# Patient Record
Sex: FEMALE | Race: WHITE | HISPANIC OR LATINO | Employment: OTHER | URBAN - METROPOLITAN AREA
[De-identification: names, ages, dates, MRNs, and addresses within clinical notes are randomized per-mention and may not be internally consistent; named-entity substitution may affect disease eponyms.]

---

## 2017-01-03 RX ORDER — CYCLOBENZAPRINE HCL 10 MG
TABLET ORAL
Qty: 60 TAB | Refills: 0 | Status: SHIPPED | OUTPATIENT
Start: 2017-01-03 | End: 2017-02-17 | Stop reason: SDUPTHER

## 2017-01-11 DIAGNOSIS — I10 ESSENTIAL HYPERTENSION: ICD-10-CM

## 2017-01-11 NOTE — TELEPHONE ENCOUNTER
Was the patient seen in the last year in this department? Yes 10/13/2016    Does patient have an active prescription for medications requested? No     Received Request Via: Pharmacy

## 2017-01-12 RX ORDER — ATENOLOL 50 MG/1
50 TABLET ORAL
Qty: 90 TAB | Refills: 0 | Status: SHIPPED | OUTPATIENT
Start: 2017-01-12 | End: 2017-04-06 | Stop reason: SDUPTHER

## 2017-01-12 NOTE — TELEPHONE ENCOUNTER
Was the patient seen in the last year in this department? Yes     Does patient have an active prescription for medications requested? No     Received Request Via: Pharmacy      Pt met protocol?: Yes  OV 10/13/16  BP Readings from Last 1 Encounters:   10/13/16 140/92

## 2017-01-16 ENCOUNTER — HOSPITAL ENCOUNTER (OUTPATIENT)
Facility: MEDICAL CENTER | Age: 67
End: 2017-01-16
Attending: NURSE PRACTITIONER
Payer: COMMERCIAL

## 2017-01-16 ENCOUNTER — HOSPITAL ENCOUNTER (OUTPATIENT)
Dept: LAB | Facility: MEDICAL CENTER | Age: 67
End: 2017-01-16
Attending: NURSE PRACTITIONER
Payer: COMMERCIAL

## 2017-01-16 ENCOUNTER — OFFICE VISIT (OUTPATIENT)
Dept: MEDICAL GROUP | Facility: PHYSICIAN GROUP | Age: 67
End: 2017-01-16
Payer: COMMERCIAL

## 2017-01-16 VITALS
HEIGHT: 67 IN | BODY MASS INDEX: 24.64 KG/M2 | TEMPERATURE: 98.2 F | HEART RATE: 68 BPM | SYSTOLIC BLOOD PRESSURE: 128 MMHG | DIASTOLIC BLOOD PRESSURE: 82 MMHG | OXYGEN SATURATION: 94 % | WEIGHT: 157 LBS | RESPIRATION RATE: 12 BRPM

## 2017-01-16 DIAGNOSIS — M25.512 CHRONIC LEFT SHOULDER PAIN: ICD-10-CM

## 2017-01-16 DIAGNOSIS — M25.562 BILATERAL CHRONIC KNEE PAIN: ICD-10-CM

## 2017-01-16 DIAGNOSIS — G89.29 CHRONIC LEFT SHOULDER PAIN: ICD-10-CM

## 2017-01-16 DIAGNOSIS — Z79.899 CONTROLLED SUBSTANCE AGREEMENT SIGNED: ICD-10-CM

## 2017-01-16 DIAGNOSIS — M54.50 CHRONIC BILATERAL LOW BACK PAIN WITHOUT SCIATICA: ICD-10-CM

## 2017-01-16 DIAGNOSIS — E03.8 OTHER SPECIFIED HYPOTHYROIDISM: ICD-10-CM

## 2017-01-16 DIAGNOSIS — G89.29 CHRONIC BILATERAL LOW BACK PAIN WITHOUT SCIATICA: ICD-10-CM

## 2017-01-16 DIAGNOSIS — F11.20 OPIOID DEPENDENCE, CONTINUOUS (HCC): ICD-10-CM

## 2017-01-16 DIAGNOSIS — M25.561 BILATERAL CHRONIC KNEE PAIN: ICD-10-CM

## 2017-01-16 DIAGNOSIS — G89.29 BILATERAL CHRONIC KNEE PAIN: ICD-10-CM

## 2017-01-16 DIAGNOSIS — E05.00 GRAVES' DISEASE: ICD-10-CM

## 2017-01-16 DIAGNOSIS — M25.512 CHRONIC LEFT SHOULDER PAIN: Primary | ICD-10-CM

## 2017-01-16 DIAGNOSIS — G89.29 CHRONIC LEFT SHOULDER PAIN: Primary | ICD-10-CM

## 2017-01-16 PROCEDURE — 80307 DRUG TEST PRSMV CHEM ANLYZR: CPT

## 2017-01-16 PROCEDURE — 36415 COLL VENOUS BLD VENIPUNCTURE: CPT

## 2017-01-16 PROCEDURE — 84439 ASSAY OF FREE THYROXINE: CPT

## 2017-01-16 PROCEDURE — G0480 DRUG TEST DEF 1-7 CLASSES: HCPCS | Mod: 91

## 2017-01-16 PROCEDURE — 99214 OFFICE O/P EST MOD 30 MIN: CPT | Performed by: NURSE PRACTITIONER

## 2017-01-16 PROCEDURE — G0480 DRUG TEST DEF 1-7 CLASSES: HCPCS

## 2017-01-16 PROCEDURE — 84443 ASSAY THYROID STIM HORMONE: CPT

## 2017-01-16 RX ORDER — HYDROCODONE BITARTRATE AND ACETAMINOPHEN 5; 325 MG/1; MG/1
1 TABLET ORAL EVERY 8 HOURS PRN
Qty: 90 TAB | Refills: 0 | Status: SHIPPED | OUTPATIENT
Start: 2017-01-16 | End: 2017-05-24 | Stop reason: SDUPTHER

## 2017-01-16 RX ORDER — HYDROCODONE BITARTRATE AND ACETAMINOPHEN 5; 325 MG/1; MG/1
1-2 TABLET ORAL EVERY 8 HOURS PRN
Qty: 90 TAB | Refills: 0 | Status: SHIPPED | OUTPATIENT
Start: 2017-01-16 | End: 2017-05-24 | Stop reason: SDUPTHER

## 2017-01-16 NOTE — MR AVS SNAPSHOT
"        Kelli Monserrat Vinsonles   2017 1:20 PM   Office Visit   MRN: [id number]    Department:  [facility]   Dept Phone:  [phone]    Description:  Female : 1950   Provider:  TOMA Landaverde           Reason for Visit     Medication Refill           Allergies as of 2017     No Known Allergies      You were diagnosed with     Chronic left shoulder pain   [187968]  -  Primary     Bilateral chronic knee pain   [839114]       Chronic bilateral low back pain without sciatica   [2036029]       Opioid dependence, continuous (CMS-HCC)   [205203]       Controlled substance agreement signed   [172244]         Vital Signs     Blood Pressure Pulse Temperature Respirations Height Weight    128/82 mmHg 68 36.8 °C (98.2 °F) 12 1.702 m (5' 7\") 71.215 kg (157 lb)    Body Mass Index Oxygen Saturation Smoking Status             24.58 kg/m2 94% Current Every Day Smoker         Basic Information     Date Of Birth Sex Race Ethnicity Preferred Language    1950 Female  or   Origin (Mauritian,Senegalese,Canadian,Canadian, etc) English      Problem List              ICD-10-CM Priority Class Noted - Resolved    Graves' disease E05.00   2011 - Present    Hypertension I10   2011 - Present    Anxiety F41.9   2011 - Present    Chronic back pain M54.9, G89.29   2011 - Present    Hyperlipidemia E78.5   2011 - Present    Routine health maintenance Z00.00   10/18/2011 - Present    Prediabetes R73.03   2011 - Present    Hypothyroid E03.9   2011 - Present    Current smoker F17.200   2013 - Present    Chronic left shoulder pain M25.512, G89.29   2015 - Present    DDD (degenerative disc disease), lumbar M51.36   2015 - Present      Health Maintenance        Date Due Completion Dates    DIABETES MONOFILAMENT / LE EXAM 6/10/1952 ---    RETINAL SCREENING 12/10/1969 ---    URINE ACR / MICROALBUMIN 12/10/1969 ---    IMM DTaP/Tdap/Td Vaccine (1 - Tdap) " 4/5/2011 4/4/2011    IMM ZOSTER VACCINE 12/10/2011 ---    BONE DENSITY 12/10/2016 9/9/2009    A1C SCREENING 2/28/2017 8/30/2016, 5/20/2016, 11/18/2015, 10/15/2014, 1/14/2014, 9/13/2012    MAMMOGRAM 5/23/2017 5/23/2016 (Declined), 8/26/2015 (Declined)    Override on 5/23/2016: Patient Declined    Override on 8/26/2015: Patient Declined    FASTING LIPID PROFILE 8/30/2017 8/30/2016, 5/20/2016, 11/18/2015, 3/12/2015, 10/15/2014, 1/14/2014, 4/18/2013, 9/13/2012, 10/18/2011, 4/28/2011    SERUM CREATININE 8/30/2017 8/30/2016, 5/20/2016, 11/18/2015, 10/15/2014, 1/14/2014, 4/18/2013, 9/13/2012, 10/18/2011, 4/28/2011    IMM PNEUMOCOCCAL 65+ (ADULT) LOW/MEDIUM RISK SERIES (2 of 2 - PPSV23) 1/14/2019 2/8/2016, 1/14/2014    PAP SMEAR 5/23/2019 5/23/2016 (Declined), 8/26/2015 (Declined)    Override on 5/23/2016: Patient Declined    Override on 8/26/2015: Patient Declined    COLONOSCOPY 1/14/2022 1/14/2012 (Prv Comp)    Override on 1/14/2012: Previously completed (DHA)            Current Immunizations     13-VALENT PCV PREVNAR 2/8/2016    Influenza TIV (IM) 1/14/2014    Influenza Vaccine Pediatric 10/18/2011    Influenza Vaccine Quad Inj (Pf) 10/13/2014    Influenza Vaccine Quad Inj (Preserved) 10/13/2016, 11/19/2015    Pneumococcal polysaccharide vaccine (PPSV-23) 1/14/2014    TD Vaccine 4/4/2011  6:19 PM      Below and/or attached are the medications your provider expects you to take. Review all of your home medications and newly ordered medications with your provider and/or pharmacist. Follow medication instructions as directed by your provider and/or pharmacist. Please keep your medication list with you and share with your provider. Update the information when medications are discontinued, doses are changed, or new medications (including over-the-counter products) are added; and carry medication information at all times in the event of emergency situations     Allergies:  No Known Allergies          Medications  Valid as of:  January 16, 2017 -  1:50 PM    Generic Name Brand Name Tablet Size Instructions for use    Atenolol (Tab) TENORMIN 50 MG Take 1 Tab by mouth every day.        Atorvastatin Calcium (Tab) LIPITOR 40 MG Take 1 Tab by mouth every day.        Cyclobenzaprine HCl (Tab) FLEXERIL 10 MG TAKE 1 TABLET BY MOUTH TWICE DAILY AS NEEDED FOR MUSCLE SPAMS        Fluticasone Propionate (Suspension) FLONASE 50 MCG/ACT Spray 1 Spray in nose every day. EACH NOSTRIL        Hydrocodone-Acetaminophen (Tab) NORCO 5-325 MG Take 1-2 Tabs by mouth every 8 hours as needed.        Hydrocodone-Acetaminophen (Tab) NORCO 5-325 MG Take 1-2 Tabs by mouth every 8 hours as needed.        Hydrocodone-Acetaminophen (Tab) NORCO 5-325 MG Take 1 Tab by mouth every 8 hours as needed.        Levothyroxine Sodium (Tab) SYNTHROID 137 MCG Take 1 Tab by mouth Every morning on an empty stomach.        Lisinopril-Hydrochlorothiazide (Tab) PRINZIDE, ZESTORETIC 20-25 MG Take 1 Tab by mouth every day.        .                 Medicines prescribed today were sent to:     [facility] [id number] [place] [address] AT [address] [address]    Phone: [phone] Fax: [phone]    Open 24 Hours?: No      Medication refill instructions:       If your prescription bottle indicates you have medication refills left, it is not necessary to call your provider’s office. Please contact your pharmacy and they will refill your medication.    If your prescription bottle indicates you do not have any refills left, you may request refills at any time through one of the following ways: The online Oris4 system (except Urgent Care), by calling your provider’s office, or by asking your pharmacy to contact your provider’s office with a refill request. Medication refills are processed only during regular business hours and may not be available until the next business day. Your provider may request additional information  or to have a follow-up visit with you prior to refilling your medication.   *Please Note: Medication refills are assigned a new Rx number when refilled electronically. Your pharmacy may indicate that no refills were authorized even though a new prescription for the same medication is available at the pharmacy. Please request the medicine by name with the pharmacy before contacting your provider for a refill.        Your To Do List     Future Labs/Procedures Complete By Expires    PAIN MANAGEMENT SCRN, W/ RFLX TO QNT  As directed 1/16/2018    Comments:    Current Meds (name, sig, last dose):   Current outpatient prescriptions:   •  hydrocodone-acetaminophen, 1-2 Tab, Oral, Q8HRS PRN  •  hydrocodone-acetaminophen, 1-2 Tab, Oral, Q8HRS PRN  •  hydrocodone-acetaminophen, 1 Tab, Oral, Q8HRS PRN  •  atenolol, 50 mg, Oral, QDAY, 1/16/2017  •  cyclobenzaprine, TAKE 1 TABLET BY MOUTH TWICE DAILY AS NEEDED FOR MUSCLE SPAMS, 1/16/2017  •  levothyroxine, 137 mcg, Oral, AM ES, 1/16/2017  •  lisinopril-hydrochlorothiazide, 1 Tab, Oral, QDAY, 1/16/2017  •  fluticasone, 1 Spray, Nasal, QDAY, 1/16/2017  •  atorvastatin, 40 mg, Oral, QDAY, 1/16/2017            Referral     A referral request has been sent to our patient care coordination department. Please allow 3-5 business days for us to process this request and contact you either by phone or mail. If you do not hear from us by the 5th business day, please call us at [phone].           iContainers Access Code: [email or web address]  Expires: 2/11/2017 11:49 AM    iContainers  A secure, online tool to manage your health information     [employer]’s iContainers® is a secure, online tool that connects you to your personalized health information from the privacy of your home -- day or night - making it very easy for you to manage your healthcare. Once the activation process is completed, you can even access your medical information using the iContainers lenard, which is available for free in the  Apple Caleb store or Google Play store.     [employer] provides the following levels of access (as shown below):   My Chart Features   [facility] Primary [facility] Doctor [facility]  Specialists [facility]  Urgent  Care Non-[facility]  Primary Care  Doctor   Email your healthcare team securely and privately 24/7 X X X    Manage appointments: schedule your next appointment; view details of past/upcoming appointments X      Request prescription refills. X      View recent personal medical records, including lab and immunizations X X X X   View health record, including health history, allergies, medications X X X X   Read reports about your outpatient visits, procedures, consult and ER notes X X X X   See your discharge summary, which is a recap of your hospital and/or ER visit that includes your diagnosis, lab results, and care plan. X X       How to register for [employer] Nation:  1. Go to  [email or web address].  2. Click on the Sign Up Now box, which takes you to the New Member Sign Up page. You will need to provide the following information:  a. Enter your B[employer] Nation Access Code exactly as it appears at the top of this page. (You will not need to use this code after you’ve completed the sign-up process. If you do not sign up before the expiration date, you must request a new code.)   b. Enter your date of birth.   c. Enter your home email address.   d. Click Submit, and follow the next screen’s instructions.  3. Create a B[employer] Nation ID. This will be your B[employer] Nation login ID and cannot be changed, so think of one that is secure and easy to remember.  4. Create a [employer] password. You can change your password at any time.  5. Enter your Password Reset Question and Answer. This can be used at a later time if you forget your password.   6. Enter your e-mail address. This allows you to receive e-mail notifications when new information is available in [employer].  7. Click Sign Up. You can now view your health information.    For assistance activating your  MyChart account, call [phone]

## 2017-01-16 NOTE — PROGRESS NOTES
Chief Complaint   Patient presents with   • Medication Refill       HISTORY OF PRESENT ILLNESS: Patient is a 66 y.o. female established patient who presents today to discuss medication refills.    Chronic pain recheck:   Last dose of controlled substance: 1/15/17  Chronic pain treated with Norco 5/325 taken 2-3 times a day  Current alcohol or substance use: no    Consequences of Chronic Opiate therapy:  (5 A's)  Analgesia:  significantly improved  Activity:  significantly improved, works full-time at [employer] as   Adverse Events:  none  Aberrant Behaviors: no inappropriate refills requested, lost or stolen meds reported.   Affect/Mood: good grooming, full facial expressions, normal speech pattern and content, normal thought patterns, normal perception, good insight, normal reasoning    Nonnarcotic treatments that are being used: Heat, ice, OTC pain patches    Pain management agreement initiated/updated and signed on: updated today  Urine drug screen done: today, which is pending.    I have reviewed the medical records, the Prescription Monitoring Program and I have determined that controlled substance treatment is medically indicated.    Allergies:Review of patient's allergies indicates no known allergies.    [facility]   Medication Sig Dispense Refill   • hydrocodone-acetaminophen (NORCO) 5-325 MG Tab per tablet Take 1-2 Tabs by mouth every 8 hours as needed. 90 Tab 0   • hydrocodone-acetaminophen (NORCO) 5-325 MG Tab per tablet Take 1-2 Tabs by mouth every 8 hours as needed. 90 Tab 0   • hydrocodone-acetaminophen (NORCO) 5-325 MG Tab per tablet Take 1 Tab by mouth every 8 hours as needed. 90 Tab 0   • atenolol (TENORMIN) 50 MG Tab Take 1 Tab by mouth every day. 90 Tab 0   • cyclobenzaprine (FLEXERIL) 10 MG Tab TAKE 1 TABLET BY MOUTH TWICE DAILY AS NEEDED FOR MUSCLE SPAMS 60 Tab 0   • levothyroxine (SYNTHROID) 137 MCG Tab Take 1 Tab by mouth Every morning on an empty  "stomach. 30 Tab 3   • lisinopril-hydrochlorothiazide (PRINZIDE, ZESTORETIC) 20-25 MG per tablet Take 1 Tab by mouth every day. 90 Tab 1   • fluticasone (FLONASE) 50 MCG/ACT nasal spray Spray 1 Spray in nose every day. EACH NOSTRIL 1 Bottle 3   • atorvastatin (LIPITOR) 40 MG Tab Take 1 Tab by mouth every day. 90 Tab 3     No current Epic-ordered facility-administered medications on file.       Past Medical History   Diagnosis Date   • Grave's disease    • Hypertension    • Hyperlipidemia    • Graves' disease 4/7/2011   • Chronic pain 4/7/2011   • Anxiety 4/7/2011   • Chronic back pain 4/7/2011   • Active smoker 4/18/2013       Social History   Substance Use Topics   • Smoking status: Current Every Day Smoker   • Smokeless tobacco: Never Used      Comment: 6-7 cigarettes/day   • Alcohol Use: 0.0 oz/week     0 Standard drinks or equivalent per week      Comment: 2-3 occasionally \"when gambling\"       No family status information on file.     Family History   Problem Relation Age of Onset   • Diabetes Mother    • Hypertension Mother    • Diabetes Sister    • Diabetes Brother    • Cancer Neg Hx    • Heart Attack Neg Hx    • Heart Disease Neg Hx        ROS: see above    Review of Systems   Constitutional: Negative for fever, chills, weight loss and malaise/fatigue.   HENT: Negative for ear pain, nosebleeds, congestion, sore throat and neck pain.    Eyes: Negative for blurred vision.   Respiratory: Negative for cough, sputum production, shortness of breath and wheezing.    Cardiovascular: Negative for chest pain, palpitations, orthopnea and leg swelling.   Gastrointestinal: Negative for heartburn, nausea, vomiting and abdominal pain.   Genitourinary: Negative for dysuria, urgency and frequency.   Musculoskeletal: Negative for myalgias, back pain and joint pain.   Skin: Negative for rash and itching.   Neurological: Negative for dizziness, tingling, tremors, sensory change, focal weakness and headaches. " "  Endo/Heme/Allergies: Does not bruise/bleed easily.   Psychiatric/Behavioral: Negative for depression, suicidal ideas and memory loss.  The patient is not nervous/anxious and does not have insomnia.        Exam:  Blood pressure 128/82, pulse 68, temperature 36.8 °C (98.2 °F), resp. rate 12, height 1.702 m (5' 7\"), weight 71.215 kg (157 lb), SpO2 94 %.  General:  Well nourished, well developed female in NAD  Head is grossly normal.  Neck: Thyroid is not enlarged.  Pulmonary: Normal effort.   Cardiovascular: Regular rate.   Extremities: no clubbing, cyanosis, or edema.  Psych:  Mood and affect are normal.  Answering questions appropriately with good eye contact.      Please note that this dictation was created using voice recognition software. I have made every reasonable attempt to correct obvious errors, but I expect that there are errors of grammar and possibly content that I did not discover before finalizing the note.    Assessment/Plan:    1. Chronic left shoulder pain  hydrocodone-acetaminophen (NORCO) 5-325 MG Tab per tablet    hydrocodone-acetaminophen (NORCO) 5-325 MG Tab per tablet    hydrocodone-acetaminophen (NORCO) 5-325 MG Tab per tablet    REFERRAL TO PHYSICAL THERAPY Reason for Therapy: Eval/Treat/Report    Controlled Substance Treatment Agreement    PAIN MANAGEMENT SCRN, W/ RFLX TO QNT   2. Bilateral chronic knee pain  hydrocodone-acetaminophen (NORCO) 5-325 MG Tab per tablet    hydrocodone-acetaminophen (NORCO) 5-325 MG Tab per tablet    hydrocodone-acetaminophen (NORCO) 5-325 MG Tab per tablet    REFERRAL TO PHYSICAL THERAPY Reason for Therapy: Eval/Treat/Report    Controlled Substance Treatment Agreement    PAIN MANAGEMENT SCRN, W/ RFLX TO QNT   3. Chronic bilateral low back pain without sciatica  hydrocodone-acetaminophen (NORCO) 5-325 MG Tab per tablet    hydrocodone-acetaminophen (NORCO) 5-325 MG Tab per tablet    hydrocodone-acetaminophen (NORCO) 5-325 MG Tab per tablet    REFERRAL TO " PHYSICAL THERAPY Reason for Therapy: Eval/Treat/Report    Controlled Substance Treatment Agreement    PAIN MANAGEMENT SCRN, W/ RFLX TO QNT   4. Opioid dependence, continuous (CMS-HCC)  hydrocodone-acetaminophen (NORCO) 5-325 MG Tab per tablet    hydrocodone-acetaminophen (NORCO) 5-325 MG Tab per tablet    hydrocodone-acetaminophen (NORCO) 5-325 MG Tab per tablet    REFERRAL TO PHYSICAL THERAPY Reason for Therapy: Eval/Treat/Report    Controlled Substance Treatment Agreement    PAIN MANAGEMENT SCRN, W/ RFLX TO QNT   5. Controlled substance agreement signed  hydrocodone-acetaminophen (NORCO) 5-325 MG Tab per tablet    hydrocodone-acetaminophen (NORCO) 5-325 MG Tab per tablet    hydrocodone-acetaminophen (NORCO) 5-325 MG Tab per tablet    REFERRAL TO PHYSICAL THERAPY Reason for Therapy: Eval/Treat/Report    Controlled Substance Treatment Agreement    PAIN MANAGEMENT SCRN, W/ RFLX TO QNT        1.  Updated UDS and pain contract today  2.  Refill medications as previously prescribed.  3.  Referral to PT to address persistent knee pain  4.  RTC in 3 months, sooner prn.

## 2017-01-17 ENCOUNTER — TELEPHONE (OUTPATIENT)
Dept: MEDICAL GROUP | Facility: PHYSICIAN GROUP | Age: 67
End: 2017-01-17

## 2017-01-17 DIAGNOSIS — E05.00 GRAVES' DISEASE: ICD-10-CM

## 2017-01-17 LAB
T4 FREE SERPL-MCNC: 1.5 NG/DL (ref 0.53–1.43)
TSH SERPL DL<=0.005 MIU/L-ACNC: 0.18 UIU/ML (ref 0.3–3.7)

## 2017-01-17 NOTE — TELEPHONE ENCOUNTER
----- Message from TOMA Landaverde sent at 1/17/2017  6:40 AM PST -----  Thyroid levels are now a little too medicated.  I would like her to alternate the 125 and 137mcg, every other day.  Plan on repeating thyroid labs in 6-8 weeks.

## 2017-01-18 LAB
AMPHET CTO UR CFM-MCNC: NEGATIVE NG/ML
BARBITURATES CTO UR CFM-MCNC: NEGATIVE NG/ML
BENZODIAZ CTO UR CFM-MCNC: NEGATIVE NG/ML
BUPRENORPHINE UR-MCNC: NEGATIVE NG/ML
CANNABINOIDS CTO UR CFM-MCNC: NEGATIVE NG/ML
CARISOPRODOL UR-MCNC: NEGATIVE NG/ML
COCAINE CTO UR CFM-MCNC: NEGATIVE NG/ML
DRUG SCREEN COMMENT UR-IMP: NORMAL
ETHYL GLUCURONIDE UR QL SCN: POSITIVE NG/ML
FENTANYL UR-MCNC: NEGATIVE NG/ML
MEPERIDINE CTO UR SCN-MCNC: NEGATIVE NG/ML
METHADONE CTO UR CFM-MCNC: NEGATIVE NG/ML
OPIATES UR QL SCN: POSITIVE NG/ML
OXYCDOXYM URSCRN 2005102: NEGATIVE NG/ML
PCP CTO UR CFM-MCNC: NEGATIVE NG/ML
PROPOXYPH CTO UR CFM-MCNC: NEGATIVE NG/ML
TAPENTADOL UR-MCNC: NEGATIVE NG/ML
TRAMADOL CTO UR SCN-MCNC: NEGATIVE NG/ML
ZOLPIDEM UR-MCNC: NEGATIVE NG/ML

## 2017-01-19 ENCOUNTER — TELEPHONE (OUTPATIENT)
Dept: MEDICAL GROUP | Facility: PHYSICIAN GROUP | Age: 67
End: 2017-01-19

## 2017-01-19 LAB
6MAM UR CFM-MCNC: <10 NG/ML
CODEINE UR CFM-MCNC: <20 NG/ML
HYDROCODONE UR CFM-MCNC: 27 NG/ML
HYDROMORPHONE UR CFM-MCNC: <20 NG/ML
MORPHINE UR CFM-MCNC: <20 NG/ML
NORHYDROCODONE UR CFM-MCNC: 118 NG/ML
NOROXYCODONE UR CFM-MCNC: <20 NG/ML
OPIATES UR NOROXYM Q0836: <20 NG/ML
OXYCODONE UR CFM-MCNC: <20 NG/ML
OXYMORPHONE UR CFM-MCNC: <20 NG/ML

## 2017-01-19 NOTE — TELEPHONE ENCOUNTER
----- Message from TOMA Landaverde sent at 1/19/2017  3:49 PM PST -----  There is evidence of alcohol in the patient's urine as well as her pain medication.  Please remind patient, much like her 's results, that it is not advised that patient use alcohol and pain medication.  If an additional random UDS reveals the presence of both, I will no longer be able to prescribe her pain medication.

## 2017-02-07 LAB
ETHYL GLUCURONIDE UR CFM-MCNC: 526 NG/ML
ETHYL SULFATE UR CFM-MCNC: 204 NG/ML

## 2017-02-07 RX ORDER — ATORVASTATIN CALCIUM 40 MG/1
TABLET, FILM COATED ORAL
Qty: 90 TAB | Refills: 0 | Status: SHIPPED | OUTPATIENT
Start: 2017-02-07 | End: 2017-05-06 | Stop reason: SDUPTHER

## 2017-02-08 NOTE — TELEPHONE ENCOUNTER
Was the patient seen in the last year in this department? Yes     Does patient have an active prescription for medications requested? No     Received Request Via: Pharmacy      Pt met protocol?: Yes pt last ov 1/2017 last lipid lab 8/2016

## 2017-02-17 RX ORDER — CYCLOBENZAPRINE HCL 10 MG
TABLET ORAL
Qty: 60 TAB | Refills: 1 | Status: SHIPPED | OUTPATIENT
Start: 2017-02-17 | End: 2017-08-24 | Stop reason: SDUPTHER

## 2017-04-07 RX ORDER — ATENOLOL 50 MG/1
TABLET ORAL
Qty: 90 TAB | Refills: 1 | Status: SHIPPED | OUTPATIENT
Start: 2017-04-07 | End: 2017-10-02 | Stop reason: SDUPTHER

## 2017-04-07 NOTE — TELEPHONE ENCOUNTER
Refill X 6 months, sent to pharmacy.Pt. Seen in the last 6 months per protocol.   Lab Results   Component Value Date/Time    SODIUM 140 08/30/2016 12:50 PM    POTASSIUM 3.7 08/30/2016 12:50 PM    CHLORIDE 104 08/30/2016 12:50 PM    CO2 26 08/30/2016 12:50 PM    GLUCOSE 110* 08/30/2016 12:50 PM    BUN 17 08/30/2016 12:50 PM    CREATININE 0.64 08/30/2016 12:50 PM

## 2017-04-07 NOTE — TELEPHONE ENCOUNTER
Was the patient seen in the last year in this department? Yes     Does patient have an active prescription for medications requested? No     Received Request Via: Pharmacy      Pt met protocol?: Yes, OV 1/17   BP Readings from Last 1 Encounters:   01/16/17 128/82

## 2017-05-08 RX ORDER — ATORVASTATIN CALCIUM 40 MG/1
TABLET, FILM COATED ORAL
Qty: 90 TAB | Refills: 0 | Status: SHIPPED | OUTPATIENT
Start: 2017-05-08 | End: 2017-08-21 | Stop reason: SDUPTHER

## 2017-05-08 NOTE — TELEPHONE ENCOUNTER
Was the patient seen in the last year in this department? Yes     Does patient have an active prescription for medications requested? No     Received Request Via: Pharmacy      Pt met protocol?: Yes pt last ov 1/2017   Lab Results   Component Value Date/Time    HDL 58 08/30/2016 12:50 PM

## 2017-05-08 NOTE — TELEPHONE ENCOUNTER
Pt has had OV within the 12 month protocol and lipid panel is current from 8/16. 3 month supply sent to pharmacy.   Lab Results   Component Value Date/Time    CHOLESTEROL,* 08/30/2016 12:50 PM    * 08/30/2016 12:50 PM    HDL 58 08/30/2016 12:50 PM    TRIGLYCERIDES 202* 08/30/2016 12:50 PM       Lab Results   Component Value Date/Time    SODIUM 140 08/30/2016 12:50 PM    POTASSIUM 3.7 08/30/2016 12:50 PM    CHLORIDE 104 08/30/2016 12:50 PM    CO2 26 08/30/2016 12:50 PM    GLUCOSE 110* 08/30/2016 12:50 PM    BUN 17 08/30/2016 12:50 PM    CREATININE 0.64 08/30/2016 12:50 PM     Lab Results   Component Value Date/Time    ALKALINE PHOSPHATASE 77 08/30/2016 12:50 PM    AST(SGOT) 25 08/30/2016 12:50 PM    ALT(SGPT) 16 08/30/2016 12:50 PM    TOTAL BILIRUBIN 0.6 08/30/2016 12:50 PM

## 2017-05-24 ENCOUNTER — OFFICE VISIT (OUTPATIENT)
Dept: MEDICAL GROUP | Facility: PHYSICIAN GROUP | Age: 67
End: 2017-05-24
Payer: COMMERCIAL

## 2017-05-24 VITALS
OXYGEN SATURATION: 96 % | HEART RATE: 62 BPM | DIASTOLIC BLOOD PRESSURE: 86 MMHG | TEMPERATURE: 98.7 F | HEIGHT: 67 IN | BODY MASS INDEX: 24.48 KG/M2 | WEIGHT: 156 LBS | RESPIRATION RATE: 12 BRPM | SYSTOLIC BLOOD PRESSURE: 132 MMHG

## 2017-05-24 DIAGNOSIS — F11.20 OPIOID DEPENDENCE, CONTINUOUS (HCC): ICD-10-CM

## 2017-05-24 DIAGNOSIS — M25.562 BILATERAL CHRONIC KNEE PAIN: Primary | ICD-10-CM

## 2017-05-24 DIAGNOSIS — Z79.899 CONTROLLED SUBSTANCE AGREEMENT SIGNED: ICD-10-CM

## 2017-05-24 DIAGNOSIS — G89.29 BILATERAL CHRONIC KNEE PAIN: Primary | ICD-10-CM

## 2017-05-24 DIAGNOSIS — M25.561 BILATERAL CHRONIC KNEE PAIN: Primary | ICD-10-CM

## 2017-05-24 DIAGNOSIS — R73.03 PREDIABETES: ICD-10-CM

## 2017-05-24 DIAGNOSIS — E05.00 GRAVES' DISEASE: ICD-10-CM

## 2017-05-24 DIAGNOSIS — G89.29 CHRONIC LEFT SHOULDER PAIN: ICD-10-CM

## 2017-05-24 DIAGNOSIS — M54.50 CHRONIC BILATERAL LOW BACK PAIN WITHOUT SCIATICA: ICD-10-CM

## 2017-05-24 DIAGNOSIS — G89.29 CHRONIC BILATERAL LOW BACK PAIN WITHOUT SCIATICA: ICD-10-CM

## 2017-05-24 DIAGNOSIS — E78.2 MIXED HYPERLIPIDEMIA: ICD-10-CM

## 2017-05-24 DIAGNOSIS — M25.512 CHRONIC LEFT SHOULDER PAIN: ICD-10-CM

## 2017-05-24 PROCEDURE — 99214 OFFICE O/P EST MOD 30 MIN: CPT | Performed by: NURSE PRACTITIONER

## 2017-05-24 RX ORDER — HYDROCODONE BITARTRATE AND ACETAMINOPHEN 5; 325 MG/1; MG/1
1-2 TABLET ORAL EVERY 8 HOURS PRN
Qty: 90 TAB | Refills: 0 | Status: SHIPPED | OUTPATIENT
Start: 2017-05-24 | End: 2017-08-24 | Stop reason: SDUPTHER

## 2017-05-24 RX ORDER — HYDROCODONE BITARTRATE AND ACETAMINOPHEN 5; 325 MG/1; MG/1
1 TABLET ORAL EVERY 8 HOURS PRN
Qty: 90 TAB | Refills: 0 | Status: SHIPPED | OUTPATIENT
Start: 2017-05-24 | End: 2017-08-24 | Stop reason: SDUPTHER

## 2017-05-24 NOTE — PROGRESS NOTES
Chief Complaint   Patient presents with   • Orders Needed     thyroid labs    • Medication Refill       HISTORY OF PRESENT ILLNESS: Patient is a 66 y.o. female established patient who presents today to discuss medication refills and lab orders:    1. Bilateral chronic knee pain 2. Chronic left shoulder pain 6. Chronic bilateral low back pain without sciatica 7. Opioid dependence, continuous (CMS-HCC) 8. Controlled substance agreement signed  Patient continues to suffer from chronic pain, mostly knee and back pain.  She has been taking Norco 2-3 tablets per day.  Last tablet was 5/23/17.  She reports adequate control of her pain with the medication dosing.  Denies any side effects.    Chronic pain recheck:   Chronic back, shoulders and knees pain since >5 years.  Taking meds as directed. No aberrant drug-related behavior, inappropriate refills requested, lost or stolen meds reported,  side effects of narcotics or drug interaction.    Last dose: 5/23/17    Current pain control: good  Physical functioning: good   Family relationships: good  Social relationships: good  Mood: good  Sleep patterns: good   Overall functioning: good  Current exercise: nothing regular  Current alcohol or substance use for pain: yes, alcohol detected in January, patient reports reduction in use.    Pain management agreement initiated and signed on: 1/2017  Patient is aware of rules & regulations (about random drug screening, no replacement of lost or stolen medications, no early refill, cannot get any narcotics from any physician, keeping one pharmacy, no illegal & non-prescribed drug use).    Pain  ROS: mild constipation, nausea, drowsiness, dizziness, dry/itchy skin, vomiting.  Denies: depressed mood, hypersomnia, fatigue, difficulty concentrating and hopelessness;  Denies: appetite suppression, urine retention  Denies: sedation, stumbling, slurred speech  Denies withdrawal symptoms: vomiting, diarrhea, anxiety, agitation, insomnia, rapid  heart rate, sweats, yawning, tearing, runny nose, sudden muscle movements.     3. Prediabetes  Patient has personal history of elevated blood sugar.  She does not monitor blood sugar values at home.  She is overdue for fasting labs.  Denies any recent urgent care or ER visits related to blood sugar abnormalities.    Lab Results   Component Value Date/Time    GLYCOHEMOGLOBIN 5.5 08/30/2016 12:50 PM        4. Mixed hyperlipidemia  Patient suffers from elevated cholesterol values.  She is taking atorvastatin 40 mg daily, denies side effects.  She is overdue for fasting labs.    5. Graves' disease  Patient's thyroid remains adequately controlled with current dose of levothyroxine at 137 µg.  She is overdue for repeat labs as TSH in January was slightly suppressed at 0.160.  Patient denies any significant change in her weight, energy level or bowel habits.    Allergies:Review of patient's allergies indicates no known allergies.    Current Outpatient Prescriptions Ordered in [facility]   Medication Sig Dispense Refill   • hydrocodone-acetaminophen (NORCO) 5-325 MG Tab per tablet Take 1-2 Tabs by mouth every 8 hours as needed. 90 Tab 0   • hydrocodone-acetaminophen (NORCO) 5-325 MG Tab per tablet Take 1-2 Tabs by mouth every 8 hours as needed. 90 Tab 0   • hydrocodone-acetaminophen (NORCO) 5-325 MG Tab per tablet Take 1 Tab by mouth every 8 hours as needed. 90 Tab 0   • atorvastatin (LIPITOR) 40 MG Tab TAKE 1 TABLET BY MOUTH EVERY DAY 90 Tab 0   • atenolol (TENORMIN) 50 MG Tab TAKE 1 TABLET BY MOUTH DAILY 90 Tab 1   • cyclobenzaprine (FLEXERIL) 10 MG Tab TAKE 1 TABLET BY MOUTH TWICE DAILY AS NEEDED MUSCLE SPASMS 60 Tab 1   • levothyroxine (SYNTHROID) 137 MCG Tab Take 1 Tab by mouth Every morning on an empty stomach. 30 Tab 3   • lisinopril-hydrochlorothiazide (PRINZIDE, ZESTORETIC) 20-25 MG per tablet Take 1 Tab by mouth every day. 90 Tab 1   • fluticasone (FLONASE) 50 MCG/ACT nasal spray Spray 1 Spray in nose every day. EACH  "NOSTRIL 1 Bottle 3     No current Epic-ordered facility-administered medications on file.       Past Medical History   Diagnosis Date   • Grave's disease    • Hypertension    • Hyperlipidemia    • Graves' disease 4/7/2011   • Chronic pain 4/7/2011   • Anxiety 4/7/2011   • Chronic back pain 4/7/2011   • Active smoker 4/18/2013       Social History   Substance Use Topics   • Smoking status: Current Every Day Smoker   • Smokeless tobacco: Never Used      Comment: 6-7 cigarettes/day   • Alcohol Use: 0.0 oz/week     0 Standard drinks or equivalent per week      Comment: 2-3 occasionally \"when gambling\"       No family status information on file.     Family History   Problem Relation Age of Onset   • Diabetes Mother    • Hypertension Mother    • Diabetes Sister    • Diabetes Brother    • Cancer Neg Hx    • Heart Attack Neg Hx    • Heart Disease Neg Hx        ROS: see above    Review of Systems   Constitutional: Negative for fever, chills, weight loss and malaise/fatigue.   HENT: Negative for ear pain, nosebleeds, congestion, sore throat and neck pain.    Eyes: Negative for blurred vision.   Respiratory: Negative for cough, sputum production, shortness of breath and wheezing.    Cardiovascular: Negative for chest pain, palpitations, orthopnea and leg swelling.   Gastrointestinal: Negative for heartburn, nausea, vomiting and abdominal pain.   Genitourinary: Negative for dysuria, urgency and frequency.   Musculoskeletal: Negative for myalgias, back pain and joint pain.   Skin: Negative for rash and itching.   Neurological: Negative for dizziness, tingling, tremors, sensory change, focal weakness and headaches.   Endo/Heme/Allergies: Does not bruise/bleed easily.   Psychiatric/Behavioral: Negative for depression, suicidal ideas and memory loss.  The patient is not nervous/anxious and does not have insomnia.        Exam:  Blood pressure 132/86, pulse 62, temperature 37.1 °C (98.7 °F), resp. rate 12, height 1.702 m (5' 7.01\"), " weight 70.761 kg (156 lb), SpO2 96 %.  General:  Well nourished, well developed female in NAD  Head is grossly normal.  Neck: Thyroid is not enlarged.  Pulmonary: Normal effort.   Cardiovascular: Regular rate.   Extremities: no clubbing, cyanosis, or edema.  Psych:  Mood and affect are normal.  Answering questions appropriately with good eye contact.      Please note that this dictation was created using voice recognition software. I have made every reasonable attempt to correct obvious errors, but I expect that there are errors of grammar and possibly content that I did not discover before finalizing the note.    Assessment/Plan:    1. Bilateral chronic knee pain  hydrocodone-acetaminophen (NORCO) 5-325 MG Tab per tablet    hydrocodone-acetaminophen (NORCO) 5-325 MG Tab per tablet    hydrocodone-acetaminophen (NORCO) 5-325 MG Tab per tablet   2. Chronic left shoulder pain  hydrocodone-acetaminophen (NORCO) 5-325 MG Tab per tablet    hydrocodone-acetaminophen (NORCO) 5-325 MG Tab per tablet    hydrocodone-acetaminophen (NORCO) 5-325 MG Tab per tablet   3. Prediabetes  HEMOGLOBIN A1C    COMP METABOLIC PANEL   4. Mixed hyperlipidemia  LIPID PROFILE   5. Graves' disease  TSH WITH REFLEX TO FT4   6. Chronic bilateral low back pain without sciatica  hydrocodone-acetaminophen (NORCO) 5-325 MG Tab per tablet    hydrocodone-acetaminophen (NORCO) 5-325 MG Tab per tablet    hydrocodone-acetaminophen (NORCO) 5-325 MG Tab per tablet   7. Opioid dependence, continuous (CMS-HCC)  hydrocodone-acetaminophen (NORCO) 5-325 MG Tab per tablet    hydrocodone-acetaminophen (NORCO) 5-325 MG Tab per tablet    hydrocodone-acetaminophen (NORCO) 5-325 MG Tab per tablet   8. Controlled substance agreement signed  hydrocodone-acetaminophen (NORCO) 5-325 MG Tab per tablet    hydrocodone-acetaminophen (NORCO) 5-325 MG Tab per tablet    hydrocodone-acetaminophen (NORCO) 5-325 MG Tab per tablet        1.  Refill pain medications as previously  prescribed, adequately controlled.  2.  Patient is overdue for fasting labs, orders given  3.  Return to clinic in 3 months, sooner if labs warrant further discussion.

## 2017-05-24 NOTE — MR AVS SNAPSHOT
"        Kelli Escalantetiffanie Villatoroteresita Vides   2017 3:20 PM   Office Visit   MRN: [id number]    Department:  [facility]   Dept Phone:  [phone]    Description:  Female : 1950   Provider:  TOMA Landaverde           Reason for Visit     Orders Needed thyroid labs     Medication Refill           Allergies as of 2017     No Known Allergies      You were diagnosed with     Bilateral chronic knee pain   [579814]  -  Primary     Chronic left shoulder pain   [063432]       Prediabetes   [462456]       Mixed hyperlipidemia   [272.2.ICD-9-CM]       Graves' disease   [390160]       Chronic bilateral low back pain without sciatica   [3302594]       Opioid dependence, continuous (CMS-HCC)   [101080]       Controlled substance agreement signed   [198495]         Vital Signs     Blood Pressure Pulse Temperature Respirations Height Weight    132/86 mmHg 62 37.1 °C (98.7 °F) 12 1.702 m (5' 7.01\") 70.761 kg (156 lb)    Body Mass Index Oxygen Saturation Smoking Status             24.43 kg/m2 96% Current Every Day Smoker         Basic Information     Date Of Birth Sex Race Ethnicity Preferred Language    1950 Female  or   Origin (Stateless,Citizen of the [place], etc) English      Problem List              ICD-10-CM Priority Class Noted - Resolved    Graves' disease E05.00   2011 - Present    Hypertension I10   2011 - Present    Anxiety F41.9   2011 - Present    Chronic back pain M54.9, G89.29   2011 - Present    Hyperlipidemia E78.5   2011 - Present    Routine health maintenance Z00.00   10/18/2011 - Present    Prediabetes R73.03   2011 - Present    Hypothyroid E03.9   2011 - Present    Current smoker F17.200   2013 - Present    Chronic left shoulder pain M25.512, G89.29   2015 - Present    DDD (degenerative disc disease), lumbar M51.36   2015 - Present      Health Maintenance        Date Due Completion Dates    DIABETES MONOFILAMENT / " LE EXAM 6/10/1952 ---    RETINAL SCREENING 12/10/1969 ---    URINE ACR / MICROALBUMIN 12/10/1969 ---    IMM DTaP/Tdap/Td Vaccine (1 - Tdap) 4/5/2011 4/4/2011    IMM ZOSTER VACCINE 12/10/2011 ---    BONE DENSITY 12/10/2016 9/9/2009    A1C SCREENING 2/28/2017 8/30/2016, 5/20/2016, 11/18/2015, 10/15/2014, 1/14/2014, 9/13/2012    MAMMOGRAM 5/23/2017 5/23/2016 (Declined), 8/26/2015 (Declined)    Override on 5/23/2016: Patient Declined    Override on 8/26/2015: Patient Declined    FASTING LIPID PROFILE 8/30/2017 8/30/2016, 5/20/2016, 11/18/2015, 3/12/2015, 10/15/2014, 1/14/2014, 4/18/2013, 9/13/2012, 10/18/2011, 4/28/2011    SERUM CREATININE 8/30/2017 8/30/2016, 5/20/2016, 11/18/2015, 10/15/2014, 1/14/2014, 4/18/2013, 9/13/2012, 10/18/2011, 4/28/2011    IMM PNEUMOCOCCAL 65+ (ADULT) LOW/MEDIUM RISK SERIES (2 of 2 - PPSV23) 1/14/2019 2/8/2016, 1/14/2014    PAP SMEAR 5/23/2019 5/23/2016 (Declined), 8/26/2015 (Declined)    Override on 5/23/2016: Patient Declined    Override on 8/26/2015: Patient Declined    COLONOSCOPY 1/14/2022 1/14/2012 (Prv Comp)    Override on 1/14/2012: Previously completed (DHA)            Current Immunizations     13-VALENT PCV PREVNAR 2/8/2016    Influenza TIV (IM) 1/14/2014    Influenza Vaccine Pediatric 10/18/2011    Influenza Vaccine Quad Inj (Pf) 10/13/2014    Influenza Vaccine Quad Inj (Preserved) 10/13/2016, 11/19/2015    Pneumococcal polysaccharide vaccine (PPSV-23) 1/14/2014    TD Vaccine 4/4/2011  6:19 PM      Below and/or attached are the medications your provider expects you to take. Review all of your home medications and newly ordered medications with your provider and/or pharmacist. Follow medication instructions as directed by your provider and/or pharmacist. Please keep your medication list with you and share with your provider. Update the information when medications are discontinued, doses are changed, or new medications (including over-the-counter products) are added; and carry  medication information at all times in the event of emergency situations     Allergies:  No Known Allergies          Medications  Valid as of: May 24, 2017 -  4:58 PM    Generic Name Brand Name Tablet Size Instructions for use    Atenolol (Tab) TENORMIN 50 MG TAKE 1 TABLET BY MOUTH DAILY        Atorvastatin Calcium (Tab) LIPITOR 40 MG TAKE 1 TABLET BY MOUTH EVERY DAY        Cyclobenzaprine HCl (Tab) FLEXERIL 10 MG TAKE 1 TABLET BY MOUTH TWICE DAILY AS NEEDED MUSCLE SPASMS        Fluticasone Propionate (Suspension) FLONASE 50 MCG/ACT Spray 1 Spray in nose every day. EACH NOSTRIL        Hydrocodone-Acetaminophen (Tab) NORCO 5-325 MG Take 1-2 Tabs by mouth every 8 hours as needed.        Hydrocodone-Acetaminophen (Tab) NORCO 5-325 MG Take 1-2 Tabs by mouth every 8 hours as needed.        Hydrocodone-Acetaminophen (Tab) NORCO 5-325 MG Take 1 Tab by mouth every 8 hours as needed.        Levothyroxine Sodium (Tab) SYNTHROID 137 MCG Take 1 Tab by mouth Every morning on an empty stomach.        Lisinopril-Hydrochlorothiazide (Tab) PRINZIDE, ZESTORETIC 20-25 MG Take 1 Tab by mouth every day.        .                 Medicines prescribed today were sent to:     [id number] - [place], [place] - [address] AT [facility] [address]    [address] [facility] [address]    Phone: [phone] Fax: [phone]    Open 24 Hours?: No    [id number] [place] - [address] [place], [place] [address] AT [facility] & [address] [address]    Phone: [phone] Fax: [phone]    Open 24 Hours?: No      Medication refill instructions:       If your prescription bottle indicates you have medication refills left, it is not necessary to call your provider’s office. Please contact your pharmacy and they will refill your medication.    If your prescription bottle indicates you do not have any refills left, you may request refills at any time through one of the following ways: The  online Shopetti system (except Urgent Care), by calling your provider’s office, or by asking your pharmacy to contact your provider’s office with a refill request. Medication refills are processed only during regular business hours and may not be available until the next business day. Your provider may request additional information or to have a follow-up visit with you prior to refilling your medication.   *Please Note: Medication refills are assigned a new Rx number when refilled electronically. Your pharmacy may indicate that no refills were authorized even though a new prescription for the same medication is available at the pharmacy. Please request the medicine by name with the pharmacy before contacting your provider for a refill.        Your To Do List     Future Labs/Procedures Complete By Expires    COMP METABOLIC PANEL  As directed 5/24/2018    HEMOGLOBIN A1C  As directed 5/24/2018    LIPID PROFILE  As directed 5/24/2018    TSH WITH REFLEX TO FT4  As directed 5/24/2018         Shopetti Access Code: [email or web address]  Expires: 6/7/2017 12:49 PM    Shopetti  A secure, online tool to manage your health information     [employer]’s Shopetti® is a secure, online tool that connects you to your personalized health information from the privacy of your home -- day or night - making it very easy for you to manage your healthcare. Once the activation process is completed, you can even access your medical information using the Shopetti caleb, which is available for free in the Apple Caleb store or Google Play store.     Shopetti provides the following levels of access (as shown below):   My Chart Features   [facility] Doctor [facility]  Specialists [facility]  Urgent  Care Non-[facility]  Primary Care  Doctor   Email your healthcare team securely and privately 24/7 X X X    Manage appointments: schedule your next appointment; view details of past/upcoming appointments X      Request prescription refills. X      View recent  personal medical records, including lab and immunizations X X X X   View health record, including health history, allergies, medications X X X X   Read reports about your outpatient visits, procedures, consult and ER notes X X X X   See your discharge summary, which is a recap of your hospital and/or ER visit that includes your diagnosis, lab results, and care plan. X X       How to register for LumiFold:  1. Go to  [email or web address].  2. Click on the Sign Up Now box, which takes you to the New Member Sign Up page. You will need to provide the following information:  a. Enter your LumiFold Access Code exactly as it appears at the top of this page. (You will not need to use this code after you’ve completed the sign-up process. If you do not sign up before the expiration date, you must request a new code.)   b. Enter your date of birth.   c. Enter your home email address.   d. Click Submit, and follow the next screen’s instructions.  3. Create a LumiFold ID. This will be your LumiFold login ID and cannot be changed, so think of one that is secure and easy to remember.  4. Create a LumiFold password. You can change your password at any time.  5. Enter your Password Reset Question and Answer. This can be used at a later time if you forget your password.   6. Enter your e-mail address. This allows you to receive e-mail notifications when new information is available in LumiFold.  7. Click Sign Up. You can now view your health information.    For assistance activating your LumiFold account, call [phone]        Quit Tobacco Information     Do you want to quit using tobacco?    Quitting tobacco decreases risks of cancer, heart and lung disease, increases life expectancy, improves sense of taste and smell, and increases spending money, among other benefits.    If you are thinking about quitting, we can help.  • [facility] Quit Tobacco Program: [phone]  o Program occurs weekly for four weeks and includes pharmacist  consultation on products to support quitting smoking or chewing tobacco. A provider referral is needed for pharmacist consultation.  • Tobacco Users Help Hotline: 7-800QUIT-NOW (631-7719) or https://[place].quitlogix.org/  o Free, confidential telephone and online coaching for [place] residents. Sessions are designed on a schedule that is convenient for you. Eligible clients receive free nicotine replacement therapy.  • Nationally: www.smokefree.gov  o Information and professional assistance to support both immediate and long-term needs as you become, and remain, a non-smoker. Smokefree.gov allows you to choose the help that best fits your needs.

## 2017-06-01 DIAGNOSIS — J30.1 ALLERGIC RHINITIS DUE TO POLLEN, UNSPECIFIED RHINITIS SEASONALITY: ICD-10-CM

## 2017-06-01 NOTE — TELEPHONE ENCOUNTER
Was the patient seen in the last year in this department? Yes     Does patient have an active prescription for medications requested? No     Received Request Via: Pharmacy      Pt met protocol?: Yes    LAST OV 05/24/2017

## 2017-06-02 RX ORDER — FLUTICASONE PROPIONATE 50 MCG
1 SPRAY, SUSPENSION (ML) NASAL
Qty: 1 BOTTLE | Refills: 11 | Status: SHIPPED | OUTPATIENT
Start: 2017-06-02 | End: 2017-08-24 | Stop reason: SDUPTHER

## 2017-08-21 ENCOUNTER — HOSPITAL ENCOUNTER (OUTPATIENT)
Dept: LAB | Facility: MEDICAL CENTER | Age: 67
End: 2017-08-21
Attending: NURSE PRACTITIONER
Payer: COMMERCIAL

## 2017-08-21 DIAGNOSIS — R73.03 PREDIABETES: ICD-10-CM

## 2017-08-21 DIAGNOSIS — E05.00 GRAVES' DISEASE: ICD-10-CM

## 2017-08-21 DIAGNOSIS — E78.2 MIXED HYPERLIPIDEMIA: ICD-10-CM

## 2017-08-21 LAB
EST. AVERAGE GLUCOSE BLD GHB EST-MCNC: 117 MG/DL
GFR SERPL CREATININE-BSD FRML MDRD: >60 ML/MIN/1.73 M 2
HBA1C MFR BLD: 5.7 % (ref 0–5.6)

## 2017-08-21 PROCEDURE — 84439 ASSAY OF FREE THYROXINE: CPT

## 2017-08-21 PROCEDURE — 36415 COLL VENOUS BLD VENIPUNCTURE: CPT

## 2017-08-21 PROCEDURE — 83036 HEMOGLOBIN GLYCOSYLATED A1C: CPT

## 2017-08-21 PROCEDURE — 84443 ASSAY THYROID STIM HORMONE: CPT

## 2017-08-21 PROCEDURE — 80053 COMPREHEN METABOLIC PANEL: CPT

## 2017-08-21 PROCEDURE — 80061 LIPID PANEL: CPT

## 2017-08-21 NOTE — TELEPHONE ENCOUNTER
Was the patient seen in the last year in this department? Yes 05/24/17    Does patient have an active prescription for medications requested? No     Received Request Via: Pharmacy

## 2017-08-22 ENCOUNTER — TELEPHONE (OUTPATIENT)
Dept: MEDICAL GROUP | Facility: PHYSICIAN GROUP | Age: 67
End: 2017-08-22

## 2017-08-22 LAB
ALBUMIN SERPL BCP-MCNC: 4.3 G/DL (ref 3.2–4.9)
ALBUMIN/GLOB SERPL: 1.7 G/DL
ALP SERPL-CCNC: 71 U/L (ref 30–99)
ALT SERPL-CCNC: 16 U/L (ref 2–50)
ANION GAP SERPL CALC-SCNC: 8 MMOL/L (ref 0–11.9)
AST SERPL-CCNC: 21 U/L (ref 12–45)
BILIRUB SERPL-MCNC: 0.6 MG/DL (ref 0.1–1.5)
BUN SERPL-MCNC: 17 MG/DL (ref 8–22)
CALCIUM SERPL-MCNC: 9.2 MG/DL (ref 8.5–10.5)
CHLORIDE SERPL-SCNC: 109 MMOL/L (ref 96–112)
CHOLEST SERPL-MCNC: 161 MG/DL (ref 100–199)
CO2 SERPL-SCNC: 23 MMOL/L (ref 20–33)
CREAT SERPL-MCNC: 0.59 MG/DL (ref 0.5–1.4)
GLOBULIN SER CALC-MCNC: 2.6 G/DL (ref 1.9–3.5)
GLUCOSE SERPL-MCNC: 89 MG/DL (ref 65–99)
HDLC SERPL-MCNC: 57 MG/DL
LDLC SERPL CALC-MCNC: 77 MG/DL
POTASSIUM SERPL-SCNC: 4 MMOL/L (ref 3.6–5.5)
PROT SERPL-MCNC: 6.9 G/DL (ref 6–8.2)
SODIUM SERPL-SCNC: 140 MMOL/L (ref 135–145)
T4 FREE SERPL-MCNC: 1.08 NG/DL (ref 0.53–1.43)
TRIGL SERPL-MCNC: 137 MG/DL (ref 0–149)
TSH SERPL DL<=0.005 MIU/L-ACNC: 14.08 UIU/ML (ref 0.3–3.7)

## 2017-08-22 NOTE — TELEPHONE ENCOUNTER
----- Message from TOMA Landaverde sent at 8/22/2017  8:11 AM PDT -----  There are some abnormalities in the labs.  We will discuss at her upcoming appt.  Thank you

## 2017-08-22 NOTE — TELEPHONE ENCOUNTER
See MA's notes below, pt has met protocol, OV 5/17   Lab Results   Component Value Date/Time    HDL 57 08/21/2017 02:06 PM

## 2017-08-23 RX ORDER — ATORVASTATIN CALCIUM 40 MG/1
40 TABLET, FILM COATED ORAL
Qty: 90 TAB | Refills: 3 | Status: SHIPPED | OUTPATIENT
Start: 2017-08-23 | End: 2018-02-02 | Stop reason: SDUPTHER

## 2017-08-23 NOTE — TELEPHONE ENCOUNTER
Pt has had OV within the 12 month protocol and lipid panel is current. 12 month supply sent to pharmacy.   Lab Results   Component Value Date/Time    CHOLESTEROL, 08/21/2017 02:06 PM    LDL 77 08/21/2017 02:06 PM    HDL 57 08/21/2017 02:06 PM    TRIGLYCERIDES 137 08/21/2017 02:06 PM       Lab Results   Component Value Date/Time    SODIUM 140 08/21/2017 02:06 PM    POTASSIUM 4.0 08/21/2017 02:06 PM    CHLORIDE 109 08/21/2017 02:06 PM    CO2 23 08/21/2017 02:06 PM    GLUCOSE 89 08/21/2017 02:06 PM    BUN 17 08/21/2017 02:06 PM    CREATININE 0.59 08/21/2017 02:06 PM     Lab Results   Component Value Date/Time    ALKALINE PHOSPHATASE 71 08/21/2017 02:06 PM    AST(SGOT) 21 08/21/2017 02:06 PM    ALT(SGPT) 16 08/21/2017 02:06 PM    TOTAL BILIRUBIN 0.6 08/21/2017 02:06 PM

## 2017-08-24 ENCOUNTER — OFFICE VISIT (OUTPATIENT)
Dept: MEDICAL GROUP | Facility: PHYSICIAN GROUP | Age: 67
End: 2017-08-24
Payer: COMMERCIAL

## 2017-08-24 ENCOUNTER — HOSPITAL ENCOUNTER (OUTPATIENT)
Facility: MEDICAL CENTER | Age: 67
End: 2017-08-24
Attending: NURSE PRACTITIONER
Payer: COMMERCIAL

## 2017-08-24 VITALS
HEART RATE: 64 BPM | OXYGEN SATURATION: 98 % | HEIGHT: 67 IN | DIASTOLIC BLOOD PRESSURE: 82 MMHG | WEIGHT: 154 LBS | BODY MASS INDEX: 24.17 KG/M2 | TEMPERATURE: 98 F | SYSTOLIC BLOOD PRESSURE: 138 MMHG | RESPIRATION RATE: 16 BRPM

## 2017-08-24 DIAGNOSIS — M25.512 CHRONIC LEFT SHOULDER PAIN: ICD-10-CM

## 2017-08-24 DIAGNOSIS — M25.561 BILATERAL CHRONIC KNEE PAIN: ICD-10-CM

## 2017-08-24 DIAGNOSIS — G89.29 CHRONIC LEFT SHOULDER PAIN: ICD-10-CM

## 2017-08-24 DIAGNOSIS — M25.562 BILATERAL CHRONIC KNEE PAIN: ICD-10-CM

## 2017-08-24 DIAGNOSIS — J30.1 ALLERGIC RHINITIS DUE TO POLLEN, UNSPECIFIED RHINITIS SEASONALITY: ICD-10-CM

## 2017-08-24 DIAGNOSIS — M54.50 CHRONIC BILATERAL LOW BACK PAIN WITHOUT SCIATICA: ICD-10-CM

## 2017-08-24 DIAGNOSIS — E05.00 GRAVES' DISEASE: Primary | ICD-10-CM

## 2017-08-24 DIAGNOSIS — G89.29 CHRONIC BILATERAL LOW BACK PAIN WITHOUT SCIATICA: ICD-10-CM

## 2017-08-24 DIAGNOSIS — Z79.899 CONTROLLED SUBSTANCE AGREEMENT SIGNED: ICD-10-CM

## 2017-08-24 DIAGNOSIS — F11.20 OPIOID DEPENDENCE, CONTINUOUS (HCC): ICD-10-CM

## 2017-08-24 DIAGNOSIS — G89.29 BILATERAL CHRONIC KNEE PAIN: ICD-10-CM

## 2017-08-24 PROCEDURE — 99214 OFFICE O/P EST MOD 30 MIN: CPT | Performed by: NURSE PRACTITIONER

## 2017-08-24 PROCEDURE — 80307 DRUG TEST PRSMV CHEM ANLYZR: CPT

## 2017-08-24 PROCEDURE — G0480 DRUG TEST DEF 1-7 CLASSES: HCPCS

## 2017-08-24 RX ORDER — CYCLOBENZAPRINE HCL 10 MG
TABLET ORAL
Qty: 60 TAB | Refills: 1 | Status: SHIPPED | OUTPATIENT
Start: 2017-08-24 | End: 2017-12-07 | Stop reason: SDUPTHER

## 2017-08-24 RX ORDER — LEVOTHYROXINE SODIUM 0.12 MG/1
125 TABLET ORAL
Qty: 90 TAB | Refills: 1 | Status: SHIPPED | OUTPATIENT
Start: 2017-08-24 | End: 2017-12-07

## 2017-08-24 RX ORDER — HYDROCODONE BITARTRATE AND ACETAMINOPHEN 5; 325 MG/1; MG/1
1-2 TABLET ORAL EVERY 8 HOURS PRN
Qty: 90 TAB | Refills: 0 | Status: SHIPPED | OUTPATIENT
Start: 2017-08-24 | End: 2018-04-27

## 2017-08-24 RX ORDER — HYDROCODONE BITARTRATE AND ACETAMINOPHEN 5; 325 MG/1; MG/1
1 TABLET ORAL EVERY 8 HOURS PRN
Qty: 90 TAB | Refills: 0 | Status: SHIPPED | OUTPATIENT
Start: 2017-08-24 | End: 2017-12-07 | Stop reason: SDUPTHER

## 2017-08-24 RX ORDER — FLUTICASONE PROPIONATE 50 MCG
1-2 SPRAY, SUSPENSION (ML) NASAL
Qty: 1 BOTTLE | Refills: 11 | Status: SHIPPED | OUTPATIENT
Start: 2017-08-24 | End: 2018-04-27 | Stop reason: SDUPTHER

## 2017-08-24 ASSESSMENT — PATIENT HEALTH QUESTIONNAIRE - PHQ9: CLINICAL INTERPRETATION OF PHQ2 SCORE: 0

## 2017-08-24 NOTE — MR AVS SNAPSHOT
"        Kelli Monserrat Morley Peewee   2017 2:20 PM   Office Visit   MRN: [id number]    Department:  [facility]   Dept Phone:  [phone]    Description:  Female : 1950   Provider:  TOMA Landaverde           Reason for Visit     Medication Refill           Allergies as of 2017     No Known Allergies      You were diagnosed with     Graves' disease   [616697]  -  Primary     Allergic rhinitis due to pollen, unspecified rhinitis seasonality   [0091199]       Bilateral chronic knee pain   [743318]       Chronic left shoulder pain   [065987]       Chronic bilateral low back pain without sciatica   [3148851]       Opioid dependence, continuous (CMS-[facility])   [174608]       Controlled substance agreement signed   [[id number]]         Vital Signs     Blood Pressure Pulse Temperature Respirations Height Weight    138/82 mmHg 64 36.7 °C (98 °F) 16 1.702 m (5' 7\") 69.854 kg (154 lb)    Body Mass Index Oxygen Saturation Smoking Status             24.11 kg/m2 98% Current Every Day Smoker         Basic Information     Date Of Birth Sex Race Ethnicity Preferred Language    1950 Female  or   Origin (Greek,Syrian,Burundian,Barbadian, etc) English      Problem List              ICD-10-CM Priority Class Noted - Resolved    Graves' disease E05.00   2011 - Present    Hypertension I10   2011 - Present    Anxiety F41.9   2011 - Present    Chronic back pain M54.9, G89.29   2011 - Present    Hyperlipidemia E78.5   2011 - Present    Routine health maintenance Z00.00   10/18/2011 - Present    Prediabetes R73.03   2011 - Present    Hypothyroid E03.9   2011 - Present    Current smoker F17.200   2013 - Present    Chronic left shoulder pain M25.512, G89.29   2015 - Present    DDD (degenerative disc disease), lumbar M51.36   2015 - Present      Health Maintenance        Date Due Completion Dates    DIABETES MONOFILAMENT / LE EXAM 6/10/1951 ---   " RETINAL SCREENING 12/10/1969 ---    URINE ACR / MICROALBUMIN 12/10/1969 ---    IMM DTaP/Tdap/Td Vaccine (1 - Tdap) 4/5/2011 4/4/2011    IMM ZOSTER VACCINE 12/10/2011 ---    BONE DENSITY 12/10/2016 9/9/2009    MAMMOGRAM 5/23/2017 5/23/2016 (Declined), 8/26/2015 (Declined)    Override on 5/23/2016: Patient Declined    Override on 8/26/2015: Patient Declined    IMM INFLUENZA (1) 9/1/2017 10/13/2016, 11/19/2015, 10/13/2014, 1/14/2014, 10/18/2011    A1C SCREENING 2/21/2018 8/21/2017, 8/30/2016, 5/20/2016, 11/18/2015, 10/15/2014, 1/14/2014, 9/13/2012    FASTING LIPID PROFILE 8/21/2018 8/21/2017, 8/30/2016, 5/20/2016, 11/18/2015, 3/12/2015, 10/15/2014, 1/14/2014, 4/18/2013, 9/13/2012, 10/18/2011, 4/28/2011    SERUM CREATININE 8/21/2018 8/21/2017, 8/30/2016, 5/20/2016, 11/18/2015, 10/15/2014, 1/14/2014, 4/18/2013, 9/13/2012, 10/18/2011, 4/28/2011    IMM PNEUMOCOCCAL 65+ (ADULT) LOW/MEDIUM RISK SERIES (2 of 2 - PPSV23) 1/14/2019 2/8/2016, 1/14/2014    PAP SMEAR 5/23/2019 5/23/2016 (Declined), 8/26/2015 (Declined)    Override on 5/23/2016: Patient Declined    Override on 8/26/2015: Patient Declined    COLONOSCOPY 1/14/2022 1/14/2012 (Prv Comp)    Override on 1/14/2012: Previously completed (DHA)            Current Immunizations     13-VALENT PCV PREVNAR 2/8/2016    Influenza TIV (IM) 1/14/2014    Influenza Vaccine Pediatric 10/18/2011    Influenza Vaccine Quad Inj (Pf) 10/13/2014    Influenza Vaccine Quad Inj (Preserved) 10/13/2016, 11/19/2015    Pneumococcal polysaccharide vaccine (PPSV-23) 1/14/2014    TD Vaccine 4/4/2011  6:19 PM      Below and/or attached are the medications your provider expects you to take. Review all of your home medications and newly ordered medications with your provider and/or pharmacist. Follow medication instructions as directed by your provider and/or pharmacist. Please keep your medication list with you and share with your provider. Update the information when medications are discontinued, doses  are changed, or new medications (including over-the-counter products) are added; and carry medication information at all times in the event of emergency situations     Allergies:  No Known Allergies          Medications  Valid as of: August 24, 2017 -  4:34 PM    Generic Name Brand Name Tablet Size Instructions for use    Atenolol (Tab) TENORMIN 50 MG TAKE 1 TABLET BY MOUTH DAILY        Atorvastatin Calcium (Tab) LIPITOR 40 MG Take 1 Tab by mouth every day.        Cyclobenzaprine HCl (Tab) FLEXERIL 10 MG TAKE 1 TABLET BY MOUTH TWICE DAILY AS NEEDED MUSCLE SPASMS        Fluticasone Propionate (Suspension) FLONASE 50 MCG/ACT Spray 1-2 Sprays in nose every day. EACH NOSTRIL        Hydrocodone-Acetaminophen (Tab) NORCO 5-325 MG Take 1 Tab by mouth every 8 hours as needed.        Hydrocodone-Acetaminophen (Tab) NORCO 5-325 MG Take 1-2 Tabs by mouth every 8 hours as needed.        Hydrocodone-Acetaminophen (Tab) NORCO 5-325 MG Take 1-2 Tabs by mouth every 8 hours as needed.        Levothyroxine Sodium (Tab) SYNTHROID 137 MCG Take 1 Tab by mouth Every morning on an empty stomach.        Levothyroxine Sodium (Tab) SYNTHROID 125 MCG Take 1 Tab by mouth Every morning on an empty stomach.        Lisinopril-Hydrochlorothiazide (Tab) PRINZIDE, ZESTORETIC 20-25 MG Take 1 Tab by mouth every day.        .                 Medicines prescribed today were sent to:     [facility] [id number] [place] [address] - [address]    [address] [facility] [address]    Phone: [phone] Fax: [phone]    Open 24 Hours?: No    [id number] - [place], [place] - [address] AT [facility] [address]    [address] [address]    Phone: [phone] Fax: [phone]    Open 24 Hours?: No      Medication refill instructions:       If your prescription bottle indicates you have medication refills left, it is not necessary to call your provider’s office. Please contact  your pharmacy and they will refill your medication.    If your prescription bottle indicates you do not have any refills left, you may request refills at any time through one of the following ways: The online [facility] system (except Urgent Care), by calling your provider’s office, or by asking your pharmacy to contact your provider’s office with a refill request. Medication refills are processed only during regular business hours and may not be available until the next business day. Your provider may request additional information or to have a follow-up visit with you prior to refilling your medication.   *Please Note: Medication refills are assigned a new Rx number when refilled electronically. Your pharmacy may indicate that no refills were authorized even though a new prescription for the same medication is available at the pharmacy. Please request the medicine by name with the pharmacy before contacting your provider for a refill.        Your To Do List     Future Labs/Procedures Complete By Expires    PAIN MANAGEMENT PANEL, SCRN W/ RFLX TO QNT  As directed 8/25/2018    TSH WITH REFLEX TO FT4  As directed 8/24/2018         [facility] Status: Patient Declined        Quit Tobacco Information     Do you want to quit using tobacco?    Quitting tobacco decreases risks of cancer, heart and lung disease, increases life expectancy, improves sense of taste and smell, and increases spending money, among other benefits.    If you are thinking about quitting, we can help.  • [facility] Quit Tobacco Program: 726.901.2336  o Program occurs weekly for four weeks and includes pharmacist consultation on products to support quitting smoking or chewing tobacco. A provider referral is needed for pharmacist consultation.  • Tobacco Users Help Hotline: 5-164-QUIT-NOW (298-9481) or https://[place].quitlogix.org/  o Free, confidential telephone and online coaching for [place] residents. Sessions are designed on a schedule that is convenient for  you. Eligible clients receive free nicotine replacement therapy.  • Nationally: www.smokefree.gov  o Information and professional assistance to support both immediate and long-term needs as you become, and remain, a non-smoker. Smokefree.gov allows you to choose the help that best fits your needs.

## 2017-08-24 NOTE — PROGRESS NOTES
Chief Complaint   Patient presents with   • Medication Refill       HISTORY OF PRESENT ILLNESS: Patient is a 66 y.o. female established patient who presents today to discuss recent lab results as well as medication management:    1. Graves' disease  Patient's TSH has increased to 14, although free T4 is adequate at 1.08.  Patient mentions that prior to the lab draw she had run out of all of the 137 µg and was taking 125 daily.  Prior to this she was alternating 137 and 125 µg and was feeling well.  She does report some sleep disruption over the past several nights in addition to increased itchiness of her skin.  She is requesting a refill of the 125 to continue to alternate the dosages.    2. Allergic rhinitis due to pollen, unspecified rhinitis seasonality  She is also requesting a refill of Flonase.  Is working well to subside rhinitis and itchy watery eyes.  Denies any bloody noses or persistent sore throat.    3. Bilateral chronic knee pain  4. Chronic left shoulder pain  5. Chronic bilateral low back pain without sciatica  6. Opioid dependence, continuous (CMS-[facility])  7. Controlled substance agreement signed  She continues to rely on hydrocodone 5/325 2-3 tablets per day for chronic back pain and joint pain.  She continues to work full time, often standing on concrete floors as a  at [employer].  The pain medication works well to alleviate some of the pain to allow for continued work and adequate sleep.  She denies any side effects with the medication.  She is requesting refill.  Her last dose of the medication was this morning.  Last urine drug screen was inconsistent, showing alcohol.  A new drug screen will be obtained today.    Allergies:Review of patient's allergies indicates no known allergies.    Current Outpatient Prescriptions Ordered in [facility]   Medication Sig Dispense Refill   • hydrocodone-acetaminophen (NORCO) 5-325 MG Tab per tablet Take 1 Tab by mouth every 8 hours as needed. 90 Tab 0   •  "hydrocodone-acetaminophen (NORCO) 5-325 MG Tab per tablet Take 1-2 Tabs by mouth every 8 hours as needed. 90 Tab 0   • hydrocodone-acetaminophen (NORCO) 5-325 MG Tab per tablet Take 1-2 Tabs by mouth every 8 hours as needed. 90 Tab 0   • levothyroxine (SYNTHROID) 125 MCG Tab Take 1 Tab by mouth Every morning on an empty stomach. 90 Tab 1   • fluticasone (FLONASE) 50 MCG/ACT nasal spray Spray 1-2 Sprays in nose every day. EACH NOSTRIL 1 Bottle 11   • cyclobenzaprine (FLEXERIL) 10 MG Tab TAKE 1 TABLET BY MOUTH TWICE DAILY AS NEEDED MUSCLE SPASMS 60 Tab 1   • atorvastatin (LIPITOR) 40 MG Tab Take 1 Tab by mouth every day. 90 Tab 3   • atenolol (TENORMIN) 50 MG Tab TAKE 1 TABLET BY MOUTH DAILY 90 Tab 1   • levothyroxine (SYNTHROID) 137 MCG Tab Take 1 Tab by mouth Every morning on an empty stomach. 30 Tab 3   • lisinopril-hydrochlorothiazide (PRINZIDE, ZESTORETIC) 20-25 MG per tablet Take 1 Tab by mouth every day. 90 Tab 1     No current Epic-ordered facility-administered medications on file.       Past Medical History   Diagnosis Date   • Grave's disease    • Hypertension    • Hyperlipidemia    • Graves' disease 4/7/2011   • Chronic pain 4/7/2011   • Anxiety 4/7/2011   • Chronic back pain 4/7/2011   • Active smoker 4/18/2013       Social History   Substance Use Topics   • Smoking status: Current Every Day Smoker   • Smokeless tobacco: Never Used      Comment: 6-7 cigarettes/day   • Alcohol Use: 0.0 oz/week     0 Standard drinks or equivalent per week      Comment: 2-3 occasionally \"when gambling\"       No family status information on file.     Family History   Problem Relation Age of Onset   • Diabetes Mother    • Hypertension Mother    • Diabetes Sister    • Diabetes Brother    • Cancer Neg Hx    • Heart Attack Neg Hx    • Heart Disease Neg Hx        ROS: see above    Review of Systems   Constitutional: Negative for fever, chills, weight loss and malaise/fatigue.   HENT: Negative for ear pain, nosebleeds, congestion, " "sore throat and neck pain.    Eyes: Negative for blurred vision.   Respiratory: Negative for cough, sputum production, shortness of breath and wheezing.    Cardiovascular: Negative for chest pain, palpitations, orthopnea and leg swelling.   Gastrointestinal: Negative for heartburn, nausea, vomiting and abdominal pain.   Genitourinary: Negative for dysuria, urgency and frequency.   Musculoskeletal: Negative for myalgias, back pain and joint pain.   Skin: Negative for rash and itching.   Neurological: Negative for dizziness, tingling, tremors, sensory change, focal weakness and headaches.   Endo/Heme/Allergies: Does not bruise/bleed easily.   Psychiatric/Behavioral: Negative for depression, suicidal ideas and memory loss.  The patient is not nervous/anxious and does not have insomnia.        Exam:  Blood pressure 138/82, pulse 64, temperature 36.7 °C (98 °F), resp. rate 16, height 1.702 m (5' 7\"), weight 69.854 kg (154 lb), SpO2 98 %.  General:  Well nourished, well developed female in NAD  Head is grossly normal.  Neck: Thyroid is not enlarged.  Pulmonary: Normal effort.   Cardiovascular: Regular rate.   Extremities: no clubbing, cyanosis, or edema.  Psych:  Mood and affect are normal.  Answering questions appropriately with good eye contact.    Please note that this dictation was created using voice recognition software. I have made every reasonable attempt to correct obvious errors, but I expect that there are errors of grammar and possibly content that I did not discover before finalizing the note.    Assessment/Plan:    1. Graves' disease  levothyroxine (SYNTHROID) 125 MCG Tab    TSH WITH REFLEX TO FT4   2. Allergic rhinitis due to pollen, unspecified rhinitis seasonality  fluticasone (FLONASE) 50 MCG/ACT nasal spray   3. Bilateral chronic knee pain  hydrocodone-acetaminophen (NORCO) 5-325 MG Tab per tablet    hydrocodone-acetaminophen (NORCO) 5-325 MG Tab per tablet    hydrocodone-acetaminophen (NORCO) 5-325 MG " Tab per tablet    cyclobenzaprine (FLEXERIL) 10 MG Tab    PAIN MANAGEMENT PANEL, SCRN W/ RFLX TO QNT   4. Chronic left shoulder pain  hydrocodone-acetaminophen (NORCO) 5-325 MG Tab per tablet    hydrocodone-acetaminophen (NORCO) 5-325 MG Tab per tablet    hydrocodone-acetaminophen (NORCO) 5-325 MG Tab per tablet    cyclobenzaprine (FLEXERIL) 10 MG Tab    PAIN MANAGEMENT PANEL, SCRN W/ RFLX TO QNT   5. Chronic bilateral low back pain without sciatica  hydrocodone-acetaminophen (NORCO) 5-325 MG Tab per tablet    hydrocodone-acetaminophen (NORCO) 5-325 MG Tab per tablet    hydrocodone-acetaminophen (NORCO) 5-325 MG Tab per tablet    cyclobenzaprine (FLEXERIL) 10 MG Tab    PAIN MANAGEMENT PANEL, SCRN W/ RFLX TO QNT   6. Opioid dependence, continuous (CMS-HCC)  hydrocodone-acetaminophen (NORCO) 5-325 MG Tab per tablet    hydrocodone-acetaminophen (NORCO) 5-325 MG Tab per tablet    hydrocodone-acetaminophen (NORCO) 5-325 MG Tab per tablet    PAIN MANAGEMENT PANEL, SCRN W/ RFLX TO QNT   7. Controlled substance agreement signed  hydrocodone-acetaminophen (NORCO) 5-325 MG Tab per tablet    hydrocodone-acetaminophen (NORCO) 5-325 MG Tab per tablet    hydrocodone-acetaminophen (NORCO) 5-325 MG Tab per tablet    PAIN MANAGEMENT PANEL, SCRN W/ RFLX TO QNT        1.  Refill thyroid medication to continue alternating 137 µg and 125 µg.  Repeat thyroid labs in 6-8 weeks.  2.  No other changes to medications at this time.  3.  Refill hydrocodone as previously prescribed, stable and well controlled.  4 return to clinic in 3 months for pain medication management, sooner if thyroid labs warrant further discussion and management..

## 2017-08-26 LAB
AMPHET CTO UR CFM-MCNC: NEGATIVE NG/ML
BARBITURATES CTO UR CFM-MCNC: NEGATIVE NG/ML
BENZODIAZ CTO UR CFM-MCNC: NEGATIVE NG/ML
BUPRENORPHINE UR-MCNC: NEGATIVE NG/ML
CANNABINOIDS CTO UR CFM-MCNC: POSITIVE NG/ML
CARISOPRODOL UR-MCNC: NEGATIVE NG/ML
COCAINE CTO UR CFM-MCNC: NEGATIVE NG/ML
DRUG SCREEN COMMENT UR-IMP: NORMAL
ETHYL GLUCURONIDE UR QL SCN: NEGATIVE NG/ML
FENTANYL UR-MCNC: NEGATIVE NG/ML
MEPERIDINE CTO UR SCN-MCNC: NEGATIVE NG/ML
METHADONE CTO UR CFM-MCNC: NEGATIVE NG/ML
OPIATES UR QL SCN: POSITIVE NG/ML
OXYCDOXYM URSCRN 2005102: NEGATIVE NG/ML
PCP CTO UR CFM-MCNC: NEGATIVE NG/ML
PROPOXYPH CTO UR CFM-MCNC: NEGATIVE NG/ML
TAPENTADOL UR-MCNC: NEGATIVE NG/ML
TRAMADOL CTO UR SCN-MCNC: NEGATIVE NG/ML
ZOLPIDEM UR-MCNC: NEGATIVE NG/ML

## 2017-08-28 ENCOUNTER — TELEPHONE (OUTPATIENT)
Dept: MEDICAL GROUP | Facility: PHYSICIAN GROUP | Age: 67
End: 2017-08-28

## 2017-08-28 DIAGNOSIS — Z79.891 CHRONIC USE OF OPIATE DRUGS THERAPEUTIC PURPOSES: ICD-10-CM

## 2017-08-28 DIAGNOSIS — R89.2 ABNORMAL DRUG SCREEN: ICD-10-CM

## 2017-08-28 DIAGNOSIS — G89.29 CHRONIC BACK PAIN GREATER THAN 3 MONTHS DURATION: ICD-10-CM

## 2017-08-28 DIAGNOSIS — M54.9 CHRONIC BACK PAIN GREATER THAN 3 MONTHS DURATION: ICD-10-CM

## 2017-08-28 LAB
6MAM UR CFM-MCNC: <10 NG/ML
CODEINE UR CFM-MCNC: <20 NG/ML
HYDROCODONE UR CFM-MCNC: 254 NG/ML
HYDROMORPHONE UR CFM-MCNC: 66 NG/ML
MORPHINE UR CFM-MCNC: <20 NG/ML
NORHYDROCODONE UR CFM-MCNC: 783 NG/ML
NOROXYCODONE UR CFM-MCNC: <20 NG/ML
OPIATES UR NOROXYM Q0836: <20 NG/ML
OXYCODONE UR CFM-MCNC: <20 NG/ML
OXYMORPHONE UR CFM-MCNC: <20 NG/ML

## 2017-08-28 NOTE — TELEPHONE ENCOUNTER
----- Message from TOMA Landaverde sent at 8/28/2017  3:53 PM PDT -----  Please let patient know that her urine sample revealed marijuana.  This breaks her pain contract. I will be placing a referral to pain management.  I highly recommend that she discontinue the use of marijuana if she wants to continue with pain medication.  Thank you

## 2017-08-30 LAB — THC UR CFM-MCNC: 65 NG/ML

## 2017-09-17 DIAGNOSIS — E05.00 GRAVES' DISEASE: ICD-10-CM

## 2017-09-18 RX ORDER — LEVOTHYROXINE SODIUM 137 UG/1
TABLET ORAL
Qty: 90 TAB | Refills: 0 | Status: SHIPPED | OUTPATIENT
Start: 2017-09-18 | End: 2017-12-07 | Stop reason: SDUPTHER

## 2017-09-18 NOTE — TELEPHONE ENCOUNTER
Patient was last seen by PCP 8-24-17. Last TSH read 14.080 (8-21-17). Will refill for 3 months.  Patient to have follow up labs in ~four weeks.  Marquis Vasquez, VaughnD

## 2017-09-18 NOTE — TELEPHONE ENCOUNTER
Was the patient seen in the last year in this department? Yes     Does patient have an active prescription for medications requested? No     Received Request Via: Pharmacy      Pt met protocol?: Yes pt last ov 8/17 has no upcoming   TSH   Date Value Ref Range Status   08/21/2017 14.080 (H) 0.300 - 3.700 uIU/mL Final

## 2017-10-03 RX ORDER — ATENOLOL 50 MG/1
TABLET ORAL
Qty: 90 TAB | Refills: 1 | Status: SHIPPED | OUTPATIENT
Start: 2017-10-03 | End: 2018-02-02 | Stop reason: SDUPTHER

## 2017-10-04 NOTE — TELEPHONE ENCOUNTER
Was the patient seen in the last year in this department? Yes       Does patient have an active prescription for medications requested? No     Received Request Via: Patient      Pt met protocol?: Yes, last ov 8/24/17, last labs 8/17  BP Readings from Last 1 Encounters:   08/24/17 138/82

## 2017-10-04 NOTE — TELEPHONE ENCOUNTER
Refill X 6 months, sent to pharmacy.Pt. Seen in the last 6 months per protocol.   Lab Results   Component Value Date/Time    SODIUM 140 08/21/2017 02:06 PM    POTASSIUM 4.0 08/21/2017 02:06 PM    CHLORIDE 109 08/21/2017 02:06 PM    CO2 23 08/21/2017 02:06 PM    GLUCOSE 89 08/21/2017 02:06 PM    BUN 17 08/21/2017 02:06 PM    CREATININE 0.59 08/21/2017 02:06 PM       Marquis Vasquez, VaughnD

## 2017-11-29 ENCOUNTER — HOSPITAL ENCOUNTER (OUTPATIENT)
Dept: LAB | Facility: MEDICAL CENTER | Age: 67
End: 2017-11-29
Attending: NURSE PRACTITIONER
Payer: COMMERCIAL

## 2017-11-29 DIAGNOSIS — E05.00 GRAVES' DISEASE: ICD-10-CM

## 2017-11-29 LAB
T4 FREE SERPL-MCNC: 0.84 NG/DL (ref 0.53–1.43)
TSH SERPL DL<=0.005 MIU/L-ACNC: 16.45 UIU/ML (ref 0.3–3.7)

## 2017-11-29 PROCEDURE — 84439 ASSAY OF FREE THYROXINE: CPT

## 2017-11-29 PROCEDURE — 84443 ASSAY THYROID STIM HORMONE: CPT

## 2017-11-29 PROCEDURE — 36415 COLL VENOUS BLD VENIPUNCTURE: CPT

## 2017-11-30 ENCOUNTER — TELEPHONE (OUTPATIENT)
Dept: MEDICAL GROUP | Facility: PHYSICIAN GROUP | Age: 67
End: 2017-11-30

## 2017-11-30 NOTE — TELEPHONE ENCOUNTER
----- Message from Demetrio Mcfadden M.D. sent at 11/30/2017  8:56 AM PST -----  This patient needs to be seen as the thyroid level is well out of range.

## 2017-12-07 ENCOUNTER — OFFICE VISIT (OUTPATIENT)
Dept: MEDICAL GROUP | Facility: PHYSICIAN GROUP | Age: 67
End: 2017-12-07
Payer: COMMERCIAL

## 2017-12-07 VITALS
TEMPERATURE: 97.6 F | HEART RATE: 67 BPM | WEIGHT: 156 LBS | OXYGEN SATURATION: 97 % | SYSTOLIC BLOOD PRESSURE: 132 MMHG | HEIGHT: 68 IN | BODY MASS INDEX: 23.64 KG/M2 | DIASTOLIC BLOOD PRESSURE: 80 MMHG | RESPIRATION RATE: 16 BRPM

## 2017-12-07 DIAGNOSIS — F11.20 OPIOID DEPENDENCE, CONTINUOUS (HCC): ICD-10-CM

## 2017-12-07 DIAGNOSIS — M54.42 CHRONIC LOW BACK PAIN WITH BILATERAL SCIATICA, UNSPECIFIED BACK PAIN LATERALITY: ICD-10-CM

## 2017-12-07 DIAGNOSIS — Z23 NEED FOR VACCINATION: ICD-10-CM

## 2017-12-07 DIAGNOSIS — G89.29 BILATERAL CHRONIC KNEE PAIN: ICD-10-CM

## 2017-12-07 DIAGNOSIS — M25.562 BILATERAL CHRONIC KNEE PAIN: ICD-10-CM

## 2017-12-07 DIAGNOSIS — G89.29 CHRONIC BILATERAL LOW BACK PAIN WITHOUT SCIATICA: ICD-10-CM

## 2017-12-07 DIAGNOSIS — G89.29 CHRONIC LEFT SHOULDER PAIN: ICD-10-CM

## 2017-12-07 DIAGNOSIS — M54.41 CHRONIC LOW BACK PAIN WITH BILATERAL SCIATICA, UNSPECIFIED BACK PAIN LATERALITY: ICD-10-CM

## 2017-12-07 DIAGNOSIS — M25.561 BILATERAL CHRONIC KNEE PAIN: ICD-10-CM

## 2017-12-07 DIAGNOSIS — E03.4 HYPOTHYROIDISM DUE TO ACQUIRED ATROPHY OF THYROID: ICD-10-CM

## 2017-12-07 DIAGNOSIS — M54.50 CHRONIC BILATERAL LOW BACK PAIN WITHOUT SCIATICA: ICD-10-CM

## 2017-12-07 DIAGNOSIS — M25.512 CHRONIC LEFT SHOULDER PAIN: ICD-10-CM

## 2017-12-07 DIAGNOSIS — G89.29 CHRONIC LOW BACK PAIN WITH BILATERAL SCIATICA, UNSPECIFIED BACK PAIN LATERALITY: ICD-10-CM

## 2017-12-07 DIAGNOSIS — Z79.899 CONTROLLED SUBSTANCE AGREEMENT SIGNED: ICD-10-CM

## 2017-12-07 DIAGNOSIS — E05.00 GRAVES' DISEASE: ICD-10-CM

## 2017-12-07 PROCEDURE — 99214 OFFICE O/P EST MOD 30 MIN: CPT | Mod: 25 | Performed by: NURSE PRACTITIONER

## 2017-12-07 PROCEDURE — 90662 IIV NO PRSV INCREASED AG IM: CPT | Performed by: NURSE PRACTITIONER

## 2017-12-07 PROCEDURE — 90471 IMMUNIZATION ADMIN: CPT | Performed by: NURSE PRACTITIONER

## 2017-12-07 RX ORDER — LEVOTHYROXINE SODIUM 0.15 MG/1
150 TABLET ORAL
Qty: 90 TAB | Refills: 0 | Status: SHIPPED | OUTPATIENT
Start: 2017-12-07 | End: 2018-02-02 | Stop reason: SDUPTHER

## 2017-12-07 RX ORDER — HYDROCODONE BITARTRATE AND ACETAMINOPHEN 5; 325 MG/1; MG/1
1 TABLET ORAL EVERY 8 HOURS PRN
Qty: 90 TAB | Refills: 0 | Status: SHIPPED | OUTPATIENT
Start: 2017-12-07 | End: 2018-01-06

## 2017-12-07 RX ORDER — CYCLOBENZAPRINE HCL 10 MG
TABLET ORAL
Qty: 60 TAB | Refills: 0 | Status: SHIPPED | OUTPATIENT
Start: 2017-12-07 | End: 2018-02-02 | Stop reason: SDUPTHER

## 2017-12-07 NOTE — ASSESSMENT & PLAN NOTE
Is here for a refill of her pain meds and flexeril.  Her last drug screen was inconsistent and showed marijuana.  Discussed that I will give her one prescription and she can make a new plan with Dr. Mcfadden.

## 2017-12-07 NOTE — PROGRESS NOTES
Chief Complaint   Patient presents with   • Follow-Up     medications and labs        HISTORY OF PRESENT ILLNESS: Patient is a 66 y.o. female  patient who presents today to discuss the following issues:    Chronic back pain  Is here for a refill of her pain meds and flexeril.  Her last drug screen was inconsistent and showed marijuana.  Discussed that I will give her one prescription and she can make a new plan with Dr. Mcfadden.    Hypothyroid  Recent TSH was 16.  Discussed plan to increase levothyroxine to 150 mcg daily.     Need for vaccination  Would like a flu shot today.        Patient Active Problem List    Diagnosis Date Noted   • Need for vaccination 12/07/2017   • Abnormal drug screen 08/28/2017   • DDD (degenerative disc disease), lumbar 08/26/2015   • Chronic left shoulder pain 07/09/2015   • Current smoker 04/18/2013   • Prediabetes 11/04/2011   • Hypothyroid 11/04/2011   • Routine health maintenance 10/18/2011   • Graves' disease 04/07/2011   • Hypertension 04/07/2011   • Anxiety 04/07/2011   • Chronic back pain 04/07/2011   • Hyperlipidemia 04/07/2011       Allergies:Patient has no known allergies.    Current Outpatient Prescriptions   Medication Sig Dispense Refill   • levothyroxine (SYNTHROID) 150 MCG Tab Take 1 Tab by mouth Every morning on an empty stomach. 90 Tab 0   • hydrocodone-acetaminophen (NORCO) 5-325 MG Tab per tablet Take 1 Tab by mouth every 8 hours as needed for up to 30 days. 90 Tab 0   • cyclobenzaprine (FLEXERIL) 10 MG Tab TAKE 1 TABLET BY MOUTH TWICE DAILY AS NEEDED MUSCLE SPASMS 60 Tab 0   • atenolol (TENORMIN) 50 MG Tab TAKE 1 TABLET BY MOUTH EVERY DAY 90 Tab 1   • hydrocodone-acetaminophen (NORCO) 5-325 MG Tab per tablet Take 1-2 Tabs by mouth every 8 hours as needed. 90 Tab 0   • hydrocodone-acetaminophen (NORCO) 5-325 MG Tab per tablet Take 1-2 Tabs by mouth every 8 hours as needed. 90 Tab 0   • fluticasone (FLONASE) 50 MCG/ACT nasal spray Spray 1-2 Sprays in nose every day. EACH  "NOSTRIL 1 Bottle 11   • atorvastatin (LIPITOR) 40 MG Tab Take 1 Tab by mouth every day. 90 Tab 3   • lisinopril-hydrochlorothiazide (PRINZIDE, ZESTORETIC) 20-25 MG per tablet Take 1 Tab by mouth every day. 90 Tab 1     No current facility-administered medications for this visit.        Social History   Substance Use Topics   • Smoking status: Current Every Day Smoker   • Smokeless tobacco: Never Used      Comment: 6-7 cigarettes/day   • Alcohol use 0.0 oz/week      Comment: 2-3 occasionally \"when gambling\"       Family Status   Relation Status   • Mother    • Sister    • Brother    • Neg Hx      Family History   Problem Relation Age of Onset   • Diabetes Mother    • Hypertension Mother    • Diabetes Sister    • Diabetes Brother    • Cancer Neg Hx    • Heart Attack Neg Hx    • Heart Disease Neg Hx        Review of Systems:   Constitutional: Negative for fever, chills, weight loss and malaise.  Positive for atigue.   HENT: Negative for ear pain, nosebleeds, congestion, sore throat and neck pain.    Eyes: Negative for blurred vision.   Respiratory: Negative for cough, sputum production, shortness of breath and wheezing.    Cardiovascular: Negative for chest pain, palpitations, orthopnea and leg swelling.   Gastrointestinal: Negative for heartburn, nausea, vomiting and abdominal pain.   Genitourinary: Negative for dysuria, urgency and frequency.   Musculoskeletal: Negative for myalgias and joint pain.  Positive for chronic low back pain.  Skin: Negative for rash and itching.   Neurological: Negative for dizziness, tingling, tremors, sensory change, focal weakness and headaches.   Endo/Heme/Allergies: Does not bruise/bleed easily.   Psychiatric/Behavioral: Negative for depression, suicidal ideas and memory loss.  The patient is not nervous/anxious and does not have insomnia.    All other systems reviewed and are negative except as in HPI.    Exam:  Blood pressure 132/80, pulse 67, temperature 36.4 °C (97.6 °F), resp. " "rate 16, height 1.727 m (5' 8\"), weight 70.8 kg (156 lb), SpO2 97 %.  General:  Well nourished, well developed female in NAD  Head: Grossly normal.  Neck: Supple without JVD or bruit. Thyroid is not enlarged.  Pulmonary: Clear to ausculation. Normal effort. No rales, ronchi, or wheezing.  Cardiovascular: Regular rate and rhythm without murmur.   Extremities: No clubbing, cyanosis, or edema.  Skin: Intact with no obvious rashes or lesions.  Neuro: Grossly intact.  Psych: Alert and oriented x 3.  Mood and affect appropriate.    Medical decision-making and discussion: Kelli is here today for follow-up.  Her Norco and flexeril were refilled for month, and her synthroid was increased 150 mcg.  She was was given a flu shot.  She will keep her appointment with Dr. Mcfadden on 12/21, and she will follow-up here with me as needed.    I have placed the below orders and discussed them with an approved delegating provider. The MA is performing the below orders under the direction of Dr. Gardiner, who have provided verbal consent for supervision.            Assessment/Plan:  1. Graves' disease  levothyroxine (SYNTHROID) 150 MCG Tab   2. Need for vaccination  INFLUENZA VACCINE, HIGH DOSE (65+ ONLY)   3. Chronic low back pain with bilateral sciatica, unspecified back pain laterality     4. Chronic left shoulder pain  hydrocodone-acetaminophen (NORCO) 5-325 MG Tab per tablet    cyclobenzaprine (FLEXERIL) 10 MG Tab   5. Opioid dependence, continuous (CMS-HCC)  hydrocodone-acetaminophen (NORCO) 5-325 MG Tab per tablet   6. Chronic bilateral low back pain without sciatica  hydrocodone-acetaminophen (NORCO) 5-325 MG Tab per tablet    cyclobenzaprine (FLEXERIL) 10 MG Tab   7. Controlled substance agreement signed  hydrocodone-acetaminophen (NORCO) 5-325 MG Tab per tablet   8. Bilateral chronic knee pain  hydrocodone-acetaminophen (NORCO) 5-325 MG Tab per tablet    cyclobenzaprine (FLEXERIL) 10 MG Tab   9. Hypothyroidism due to acquired atrophy of " thyroid         Return if symptoms worsen or fail to improve.    Please note that this dictation was created using voice recognition software. I have made every reasonable attempt to correct obvious errors, but I expect that there are errors of grammar and possibly content that I did not discover before finalizing the note.

## 2018-01-25 ENCOUNTER — TELEPHONE (OUTPATIENT)
Dept: MEDICAL GROUP | Facility: PHYSICIAN GROUP | Age: 68
End: 2018-01-25

## 2018-01-25 DIAGNOSIS — G89.29 CHRONIC BACK PAIN GREATER THAN 3 MONTHS DURATION: ICD-10-CM

## 2018-01-25 DIAGNOSIS — M54.9 CHRONIC BACK PAIN GREATER THAN 3 MONTHS DURATION: ICD-10-CM

## 2018-01-25 DIAGNOSIS — Z79.891 CHRONIC USE OF OPIATE DRUGS THERAPEUTIC PURPOSES: ICD-10-CM

## 2018-01-25 DIAGNOSIS — R89.2 ABNORMAL DRUG SCREEN: ICD-10-CM

## 2018-01-26 NOTE — TELEPHONE ENCOUNTER
Referral in [facility] is completed.  Pt request that it be re-opened.    1. Caller Name: Kelli Vides                                           Call Back Number: [phone] (home)         Patient approves a detailed voicemail message: N\A    2. SPECIFIC Action To Be Taken: Referral pending, please sign.    3. Diagnosis/Clinical Reason for Request: Z79.891, M54.9,G89.29    4. Specialty & Provider Name/Lab/Imaging Location: [facility]    5. Is appointment scheduled for requested order/referral: yes - can get in next week    Patient informed they will receive a return phone call from the office ONLY if there are any questions before processing their request. Advised to call back if they haven't received a call from the referral department in 5 days.

## 2018-02-02 ENCOUNTER — OFFICE VISIT (OUTPATIENT)
Dept: MEDICAL GROUP | Facility: PHYSICIAN GROUP | Age: 68
End: 2018-02-02
Payer: COMMERCIAL

## 2018-02-02 VITALS
TEMPERATURE: 98 F | OXYGEN SATURATION: 96 % | HEIGHT: 68 IN | SYSTOLIC BLOOD PRESSURE: 120 MMHG | WEIGHT: 156 LBS | HEART RATE: 67 BPM | BODY MASS INDEX: 23.64 KG/M2 | RESPIRATION RATE: 16 BRPM | DIASTOLIC BLOOD PRESSURE: 86 MMHG

## 2018-02-02 DIAGNOSIS — E03.4 HYPOTHYROIDISM DUE TO ACQUIRED ATROPHY OF THYROID: ICD-10-CM

## 2018-02-02 DIAGNOSIS — M25.561 BILATERAL CHRONIC KNEE PAIN: ICD-10-CM

## 2018-02-02 DIAGNOSIS — M25.562 BILATERAL CHRONIC KNEE PAIN: ICD-10-CM

## 2018-02-02 DIAGNOSIS — G89.29 CHRONIC BILATERAL LOW BACK PAIN WITHOUT SCIATICA: ICD-10-CM

## 2018-02-02 DIAGNOSIS — M54.50 CHRONIC BILATERAL LOW BACK PAIN WITHOUT SCIATICA: ICD-10-CM

## 2018-02-02 DIAGNOSIS — I10 ESSENTIAL HYPERTENSION: ICD-10-CM

## 2018-02-02 DIAGNOSIS — M25.512 CHRONIC LEFT SHOULDER PAIN: ICD-10-CM

## 2018-02-02 DIAGNOSIS — Z78.0 POST-MENOPAUSAL: ICD-10-CM

## 2018-02-02 DIAGNOSIS — R73.03 PREDIABETES: ICD-10-CM

## 2018-02-02 DIAGNOSIS — G89.29 BILATERAL CHRONIC KNEE PAIN: ICD-10-CM

## 2018-02-02 DIAGNOSIS — Z12.39 BREAST CANCER SCREENING: ICD-10-CM

## 2018-02-02 DIAGNOSIS — G89.29 CHRONIC LEFT SHOULDER PAIN: ICD-10-CM

## 2018-02-02 DIAGNOSIS — E05.00 GRAVES' DISEASE: ICD-10-CM

## 2018-02-02 DIAGNOSIS — E78.2 MIXED HYPERLIPIDEMIA: ICD-10-CM

## 2018-02-02 PROCEDURE — 99214 OFFICE O/P EST MOD 30 MIN: CPT | Performed by: INTERNAL MEDICINE

## 2018-02-02 RX ORDER — ATENOLOL 50 MG/1
50 TABLET ORAL
Qty: 90 TAB | Refills: 1 | Status: SHIPPED | OUTPATIENT
Start: 2018-02-02 | End: 2018-04-27 | Stop reason: SDUPTHER

## 2018-02-02 RX ORDER — ATORVASTATIN CALCIUM 40 MG/1
40 TABLET, FILM COATED ORAL
Qty: 90 TAB | Refills: 3 | Status: SHIPPED | OUTPATIENT
Start: 2018-02-02 | End: 2018-04-27 | Stop reason: SDUPTHER

## 2018-02-02 RX ORDER — CYCLOBENZAPRINE HCL 10 MG
TABLET ORAL
Qty: 60 TAB | Refills: 0 | Status: SHIPPED | OUTPATIENT
Start: 2018-02-02 | End: 2018-07-16 | Stop reason: SDUPTHER

## 2018-02-02 RX ORDER — LEVOTHYROXINE SODIUM 0.15 MG/1
150 TABLET ORAL
Qty: 90 TAB | Refills: 0 | Status: SHIPPED | OUTPATIENT
Start: 2018-02-02 | End: 2018-04-27 | Stop reason: SDUPTHER

## 2018-02-02 NOTE — PATIENT INSTRUCTIONS
Sleep Hygiene Tips - Research & Treatments  From [employer] Association    Getting good sleep is important in maintaining health. There are several things that you can do to promote good sleep and sleep hygiene, and ultimately get better sleep.  What is sleep hygiene?  Sleep hygiene is defined as behaviors that one can do to help promote good sleep using behavioral interventions.    TIPS:     Maintain a regular sleep routine  · Go to bed at the same time. Wake up at the same time. Ideally, your schedule will remain the same (+/- 20 minutes) every night of the week.  Avoid naps if possible  Naps decrease the ‘Sleep Debt’ that is so necessary for easy sleep onset.  Each of us needs a certain amount of sleep per 24-hour period. We need that amount, and we don’t need more than that.When we take naps, it decreases the amount of sleep that we need the next night - which may cause sleep fragmentation and difficulty initiating sleep, and may lead to insomnia.  Don’t stay in bed awake for more than 5-10 minutes.  If you find your mind racing, or worrying about not being able to sleep during the middle of the night, get out of bed, and sit in a chair in the dark. Do your mind racing in the chair until you are sleepy, then return to bed. No TV or internet during these periods! That will just stimulate you more than desired.            If this happens several times during the night, that is OK. Just maintain your           regular wake time, and try to avoid naps.  Don’t watch TV or read in bed.  When you watch TV or read in bed, you associate the bed with wakefulness.  Drink caffeinated drinks with caution   · The effects of caffeine may last for several hours after ingestion. Caffeine can fragment sleep, and cause difficulty initiating sleep. If you drink caffeine, use it only before noon.   Remember that soda and tea contain caffeine as well.  Avoid inappropriate substances that interfere with sleep       Cigarettes,  alcohol, and over-the-counter medications may cause fragmented sleep.  Exercise regularly  Exercise before 2 pm every day. Exercise promotes continuous sleep.  · Avoid rigorous exercise before bedtime. Rigorous exercise circulates endorphins into the body which may cause difficulty initiating sleep.   Have a quiet, comfortable bedroom      Set your bedroom thermostat at a comfortable temperature. Generally, a little cooler        is better than a little warmer. Turn off the TV and other extraneous noise that may disrupt sleep. Background ‘white noise’ like a fan is Ok. If your pets awaken you, keep them outside the bedroom. Your bedroom should be dark. Turn off bright lights.  Have a comfortable mattress.  If you are a ‘clock watcher’ at night, hide the clock.  Have a comfortable pre-bedtime routine  A warm bath, shower  Meditation, or quiet time  Some who are struggling with sleep regularly find it helpful to print out these recommendations and read them regularly. If you accidentally miss some of recommendations, or have a bad night, do not fret. By following these sleep hygiene recommendations, you will help yourself to get into a routine that promotes good sleep opportunities.

## 2018-02-02 NOTE — ASSESSMENT & PLAN NOTE
Has been taking norco prescribed by this office and requesting refill. Takes 1-2 flexeril a day as needed. Setting up an appointment with a pain provider currently.

## 2018-02-02 NOTE — ASSESSMENT & PLAN NOTE
Her TSH as of last month was 16 and her dose was increased from levothyroxine 137 mcg to 150 mcg. Having more itching now. No changes in symptoms when her TSH was elevated and feels no different now.

## 2018-02-02 NOTE — PROGRESS NOTES
PRIMARY CARE CLINIC NEW PATIENT H&P  Chief Complaint   Patient presents with   • Medication Refill   • Orders Needed     labs      History of Present Illness     Hypothyroid  Her TSH as of last month was 16 and her dose was increased from levothyroxine 137 mcg to 150 mcg. Having more itching now. No changes in symptoms when her TSH was elevated and feels no different now.     Prediabetes  Monitored, not on any medications.     Hyperlipidemia  Has been on atorvastatin 40 mg for long standing time.     Chronic back pain  Has been taking norco prescribed by this office and requesting refill. Takes 1-2 flexeril a day as needed. Setting up an appointment with a pain provider currently.     Hypertension  Has been on atenolol for quite a while.     Graves' disease  Having dry eyes. Using over the counter drops and following with eye doctor.     Current Outpatient Prescriptions   Medication Sig Dispense Refill   • levothyroxine (SYNTHROID) 150 MCG Tab Take 1 Tab by mouth Every morning on an empty stomach. 90 Tab 0   • cyclobenzaprine (FLEXERIL) 10 MG Tab TAKE 1 TABLET BY MOUTH TWICE DAILY AS NEEDED MUSCLE SPASMS 60 Tab 0   • atenolol (TENORMIN) 50 MG Tab Take 1 Tab by mouth every day. 90 Tab 1   • atorvastatin (LIPITOR) 40 MG Tab Take 1 Tab by mouth every day. 90 Tab 3   • hydrocodone-acetaminophen (NORCO) 5-325 MG Tab per tablet Take 1-2 Tabs by mouth every 8 hours as needed. 90 Tab 0   • hydrocodone-acetaminophen (NORCO) 5-325 MG Tab per tablet Take 1-2 Tabs by mouth every 8 hours as needed. 90 Tab 0   • fluticasone (FLONASE) 50 MCG/ACT nasal spray Spray 1-2 Sprays in nose every day. EACH NOSTRIL 1 Bottle 11     No current facility-administered medications for this visit.        Past Medical History:   Diagnosis Date   • Active smoker 4/18/2013   • Anxiety 4/7/2011   • Chronic back pain 4/7/2011   • Chronic pain 4/7/2011   • Grave's disease    • Graves' disease 4/7/2011   • Hyperlipidemia    • Hypertension      Past  "Surgical History:   Procedure Laterality Date   • TONSILLECTOMY AND ADENOIDECTOMY       Social History   Substance Use Topics   • Smoking status: Current Every Day Smoker     Packs/day: 0.50     Years: 30.00   • Smokeless tobacco: Never Used   • Alcohol use 0.0 oz/week      Comment: few beers on day off      Social History     Social History Narrative     at [employer]      Family History   Problem Relation Age of Onset   • Diabetes Mother    • Hypertension Mother    • Diabetes Sister    • Diabetes Brother    • Alcohol abuse Father    • Cancer Neg Hx    • Heart Attack Neg Hx    • Heart Disease Neg Hx      Family Status   Relation Status   • Mother    • Sister Alive   • Brother    • Father    • Neg Hx      Allergies: Patient has no known allergies.    ROS  Constitutional: Negative for fatigue/generalized weakness.   HEENT: Negative for  vision changes, hearing changes    Respiratory: Negative for shortness of breath  Cardiovascular: Negative for chest pain, palpitations  Gastrointestinal: Negative for blood in stool, constipation, diarrhea  Genitourinary: Negative for dysuria, polyuria  Musculoskeletal: Positive for chronic back pain   Skin: Negative for rash  Neurological: Negative for numbness, tingling  Psychiatric/Behavioral: Negative for depression, anxiety     Objective   Blood pressure 120/86, pulse 67, temperature 36.7 °C (98 °F), resp. rate 16, height 1.727 m (5' 8\"), weight 70.8 kg (156 lb), SpO2 96 %. Body mass index is 23.72 kg/m².    General: Alert, oriented. In no acute distress   HEET: EOMI, PERRL, conjunctiva non-injected, sclera non-icteric.  Nares patent with no significant congestion or drainage.  Jennifer pinnae, external auditory canals, TM pearly gray with normal light reflex bilaterally.Oral mucous membranes pink and moist with no lesions. Bulging eyes, anisocoria   Neck: supple with no cervical, subclavicular lymphadenopathy, JVD, palpable thyroid nodules   Lungs: clear " to auscultation bilaterally with good excursion.  CV: regular rate and rhythm.  Abdomen soft, non-distended, non-tender with normal bowel sounds. No hepatosplenomegaly, no masses palpated  Skin: no lesions. Warm, dry   Psychiatric: appropriate mood and affect       Assessment and Plan   The following treatment plan was discussed     1. Graves' disease  S/p radioactive ablation. TSH was 16 last month when her levothyroxine was up-titrated from 137 mcg to 150 mcg. Will recheck TSH to see if she is now at goal or needs another dose adjustment.   - levothyroxine (SYNTHROID) 150 MCG Tab; Take 1 Tab by mouth Every morning on an empty stomach.  Dispense: 90 Tab; Refill: 0    2. Bilateral chronic knee pain  Explained to patient that I will not be providing opiate prescriptions, she expressed understanding. Per chart review she has history of abnormal UDS with prior provider. Gave her this refill of flexeril but explained that further refills will need to come from pain clinic once she establishes with them.   - cyclobenzaprine (FLEXERIL) 10 MG Tab; TAKE 1 TABLET BY MOUTH TWICE DAILY AS NEEDED MUSCLE SPASMS  Dispense: 60 Tab; Refill: 0    3. Chronic back pain   Explained to patient that I will not be providing opiate prescriptions, she expressed understanding. Per chart review she has history of abnormal UDS with prior provider. Gave her this refill of flexeril but explained that further refills will need to come from pain clinic once she establishes with them. Her muscle aches may also be from uncontrolled hypothyroidism, will recheck TSH as above.   - cyclobenzaprine (FLEXERIL) 10 MG Tab; TAKE 1 TABLET BY MOUTH TWICE DAILY AS NEEDED MUSCLE SPASMS  Dispense: 60 Tab; Refill: 0    4. Prediabetes  HgbA1c 5.7% as of 8/21/2017, will recheck at next follow up 4/2018.     5. Mixed hyperlipidemia  Lipids at goal on atorvastatin 40 mg daily.   - atorvastatin (LIPITOR) 40 MG Tab; Take 1 Tab by mouth every day.  Dispense: 90 Tab;  Refill: 3    Lab Results   Component Value Date/Time    CHOLSTRLTOT 161 08/21/2017 02:06 PM    LDL 77 08/21/2017 02:06 PM    HDL 57 08/21/2017 02:06 PM    TRIGLYCERIDE 137 08/21/2017 02:06 PM     6. Essential hypertension  Blood pressure at goal, 120/86 today. Continue atenolol 50 mg daily.   - atenolol (TENORMIN) 50 MG Tab; Take 1 Tab by mouth every day.  Dispense: 90 Tab; Refill: 1    7. Breast cancer screening  Due for screening mammogram, ordered.   - MA-SCREEN MAMMO W/CAD-BILAT; Future    8. Post-menopausal  Last DEXA 2009 showed osteopenia, advised to take calcium and vitamin D supplements. Will recheck DEXA.   - DS-BONE DENSITY STUDY (DEXA); Future      Return if symptoms worsen or fail to improve.    Health Maintenance      Health Maintenance Due   Topic Date Due   • IMM DTaP/Tdap/Td Vaccine (1 - Tdap) 04/05/2011   • IMM ZOSTER VACCINE  12/10/2011   • BONE DENSITY  12/10/2016   • MAMMOGRAM  05/23/2017       Demetrio Mcfadden MD  Internal Medicine  [facility]

## 2018-04-26 ENCOUNTER — HOSPITAL ENCOUNTER (OUTPATIENT)
Dept: LAB | Facility: MEDICAL CENTER | Age: 68
End: 2018-04-26
Attending: INTERNAL MEDICINE
Payer: COMMERCIAL

## 2018-04-26 DIAGNOSIS — E03.4 HYPOTHYROIDISM DUE TO ACQUIRED ATROPHY OF THYROID: ICD-10-CM

## 2018-04-26 LAB — TSH SERPL DL<=0.005 MIU/L-ACNC: 0.55 UIU/ML (ref 0.38–5.33)

## 2018-04-26 PROCEDURE — 84443 ASSAY THYROID STIM HORMONE: CPT

## 2018-04-26 PROCEDURE — 36415 COLL VENOUS BLD VENIPUNCTURE: CPT

## 2018-04-27 ENCOUNTER — OFFICE VISIT (OUTPATIENT)
Dept: MEDICAL GROUP | Facility: PHYSICIAN GROUP | Age: 68
End: 2018-04-27
Payer: COMMERCIAL

## 2018-04-27 VITALS
WEIGHT: 159 LBS | OXYGEN SATURATION: 96 % | HEART RATE: 68 BPM | RESPIRATION RATE: 16 BRPM | DIASTOLIC BLOOD PRESSURE: 84 MMHG | TEMPERATURE: 97.9 F | BODY MASS INDEX: 24.1 KG/M2 | HEIGHT: 68 IN | SYSTOLIC BLOOD PRESSURE: 114 MMHG

## 2018-04-27 DIAGNOSIS — J30.89 SEASONAL ALLERGIC RHINITIS DUE TO FUNGAL SPORES, UNSPECIFIED CHRONICITY: ICD-10-CM

## 2018-04-27 DIAGNOSIS — E05.00 GRAVES' DISEASE: ICD-10-CM

## 2018-04-27 DIAGNOSIS — F17.200 CURRENT SMOKER: ICD-10-CM

## 2018-04-27 DIAGNOSIS — E78.2 MIXED HYPERLIPIDEMIA: ICD-10-CM

## 2018-04-27 DIAGNOSIS — I10 ESSENTIAL HYPERTENSION: ICD-10-CM

## 2018-04-27 DIAGNOSIS — M51.36 DDD (DEGENERATIVE DISC DISEASE), LUMBAR: ICD-10-CM

## 2018-04-27 PROCEDURE — 99214 OFFICE O/P EST MOD 30 MIN: CPT | Performed by: INTERNAL MEDICINE

## 2018-04-27 RX ORDER — LEVOTHYROXINE SODIUM 0.15 MG/1
150 TABLET ORAL
Qty: 90 TAB | Refills: 0 | Status: SHIPPED | OUTPATIENT
Start: 2018-04-27 | End: 2018-08-02 | Stop reason: SDUPTHER

## 2018-04-27 RX ORDER — FLUTICASONE PROPIONATE 50 MCG
1-2 SPRAY, SUSPENSION (ML) NASAL
Qty: 1 BOTTLE | Refills: 2 | Status: SHIPPED | OUTPATIENT
Start: 2018-04-27 | End: 2018-07-31 | Stop reason: SDUPTHER

## 2018-04-27 RX ORDER — ATORVASTATIN CALCIUM 40 MG/1
40 TABLET, FILM COATED ORAL
Qty: 90 TAB | Refills: 3 | Status: SHIPPED | OUTPATIENT
Start: 2018-04-27 | End: 2018-12-11 | Stop reason: SDUPTHER

## 2018-04-27 RX ORDER — ATENOLOL 50 MG/1
50 TABLET ORAL
Qty: 90 TAB | Refills: 3 | Status: SHIPPED | OUTPATIENT
Start: 2018-04-27 | End: 2018-12-11 | Stop reason: SDUPTHER

## 2018-04-27 NOTE — PROGRESS NOTES
PRIMARY CARE CLINIC FOLLOW UP VISIT  Chief Complaint   Patient presents with   • Allergic Rhinitis     Flonase Refill    • Hyperlipidemia     Med Management    • Hypertension     Med Management    • Graves' Disease     Med Management      History of Present Illness     Graves' disease  TSH at goal as of yesterday on levothyroxine 150 mcg.     Hyperlipidemia  Controlled on atorvastatin 40 mg daily.     Hypertension  Controlled atenolol 50 mg daily. Denies lightheadedness and dizziness.     DDD (degenerative disc disease), lumbar  Following with [facility] but cannot recall the specific provider's name.     Current smoker  Still smoking. Not very motivated to quit. She was able to stop smoking for a year when she used Chantix but then picked it up again when she got bored.     Current Outpatient Prescriptions   Medication Sig Dispense Refill   • levothyroxine (SYNTHROID) 150 MCG Tab Take 1 Tab by mouth Every morning on an empty stomach. 90 Tab 0   • atenolol (TENORMIN) 50 MG Tab Take 1 Tab by mouth every day. 90 Tab 3   • atorvastatin (LIPITOR) 40 MG Tab Take 1 Tab by mouth every day. 90 Tab 3   • fluticasone (FLONASE) 50 MCG/ACT nasal spray Spray 1-2 Sprays in nose every day. EACH NOSTRIL 1 Bottle 2   • varenicline (CHANTIX JIMMY) 0.5 MG X 11 & 1 MG X 42 tablet 0.5 mg daily for 3 days, then 0.5 mg twice daily for 4 days, then 1 mg twice daily for 11 weeks. 56 Tab 3   • cyclobenzaprine (FLEXERIL) 10 MG Tab TAKE 1 TABLET BY MOUTH TWICE DAILY AS NEEDED MUSCLE SPASMS 60 Tab 0     No current facility-administered medications for this visit.      Past Medical History:   Diagnosis Date   • Active smoker 4/18/2013   • Anxiety 4/7/2011   • Chronic back pain 4/7/2011   • Chronic pain 4/7/2011   • Grave's disease    • Graves' disease 4/7/2011   • Hyperlipidemia    • Hypertension      Past Surgical History:   Procedure Laterality Date   • TONSILLECTOMY AND ADENOIDECTOMY       Social History   Substance Use Topics   •  "Smoking status: Current Every Day Smoker     Packs/day: 0.50     Years: 30.00   • Smokeless tobacco: Never Used   • Alcohol use 0.0 oz/week      Comment: few beers on day off      Social History     Social History Narrative     at [employer]      Family History   Problem Relation Age of Onset   • Diabetes Mother    • Hypertension Mother    • Diabetes Sister    • Diabetes Brother    • Alcohol abuse Father    • Cancer Neg Hx    • Heart Attack Neg Hx    • Heart Disease Neg Hx      Family Status   Relation Status   • Mother    • Sister Alive   • Brother    • Father    • Neg Hx      Allergies: Patient has no known allergies.    ROS  As per HPI above. All other systems reviewed and negative.        Objective   Blood pressure 114/84, pulse 68, temperature 36.6 °C (97.9 °F), resp. rate 16, height 1.727 m (5' 8\"), weight 72.1 kg (159 lb), SpO2 96 %. Body mass index is 24.18 kg/m².    General: alert and oriented, pleasant, cooperative  HEENT: Normocephalic, atraumatic.   Cardiovascular: regular rate and rhythm, normal S1/S2  Pulmonary: lungs clear to auscultation bilaterally  Lymphatics: no cervical or supraclavicular lymphadenopathy   Skin: warm and dry, no lesions or rashes  Psychiatric: appropriate mood and affect. Good insight and appropriate judgment     Assessment and Plan   The following treatment plan was discussed     1. Graves' disease  TSH at goal, continue levothyroxine 150 mcg.   - levothyroxine (SYNTHROID) 150 MCG Tab; Take 1 Tab by mouth Every morning on an empty stomach.  Dispense: 90 Tab; Refill: 0    2. Mixed hyperlipidemia  Well controlled on atorvastatin 40 mg daily, continue present dose.   - atorvastatin (LIPITOR) 40 MG Tab; Take 1 Tab by mouth every day.  Dispense: 90 Tab; Refill: 3    3. Essential hypertension  Well controlled on atenolol 50 mg daily, continue present dose.   - atenolol (TENORMIN) 50 MG Tab; Take 1 Tab by mouth every day.  Dispense: 90 Tab; Refill: 3    4. DDD " (degenerative disc disease), lumbar  Following with [facility].     5. Seasonal allergic rhinitis due to fungal spores, unspecified chronicity  Uses flonase in spurts when needed.   - fluticasone (FLONASE) 50 MCG/ACT nasal spray; Spray 1-2 Sprays in nose every day. EACH NOSTRIL  Dispense: 1 Bottle; Refill: 2    6. Current smoker  She had success quitting for a year with chantix before, will try this again and follow up in 3 months.   - varenicline (CHANTIX JIMMY) 0.5 MG X 11 & 1 MG X 42 tablet; 0.5 mg daily for 3 days, then 0.5 mg twice daily for 4 days, then 1 mg twice daily for 11 weeks.  Dispense: 56 Tab; Refill: 3      Healthcare maintenance     Health Maintenance Due   Topic Date Due   • IMM DTaP/Tdap/Td Vaccine (1 - Tdap) 04/05/2011   • BONE DENSITY  12/10/2016   • MAMMOGRAM  05/23/2017   • A1C SCREENING  02/21/2018     Reminded her to get her bone density and mammogram done     Return in about 3 months (around 7/27/2018).    Demetrio Mcfadden MD  Internal Medicine  [facility]

## 2018-04-27 NOTE — ASSESSMENT & PLAN NOTE
Still smoking. Not very motivated to quit. She was able to stop smoking for a year when she used Chantix but then picked it up again when she got bored.

## 2018-07-16 DIAGNOSIS — M25.562 BILATERAL CHRONIC KNEE PAIN: ICD-10-CM

## 2018-07-16 DIAGNOSIS — G89.29 CHRONIC BILATERAL LOW BACK PAIN WITHOUT SCIATICA: ICD-10-CM

## 2018-07-16 DIAGNOSIS — M25.561 BILATERAL CHRONIC KNEE PAIN: ICD-10-CM

## 2018-07-16 DIAGNOSIS — G89.29 BILATERAL CHRONIC KNEE PAIN: ICD-10-CM

## 2018-07-16 DIAGNOSIS — G89.29 CHRONIC LEFT SHOULDER PAIN: ICD-10-CM

## 2018-07-16 DIAGNOSIS — M54.50 CHRONIC BILATERAL LOW BACK PAIN WITHOUT SCIATICA: ICD-10-CM

## 2018-07-16 DIAGNOSIS — M25.512 CHRONIC LEFT SHOULDER PAIN: ICD-10-CM

## 2018-07-17 RX ORDER — CYCLOBENZAPRINE HCL 10 MG
TABLET ORAL
Qty: 60 TAB | Refills: 0 | Status: SHIPPED | OUTPATIENT
Start: 2018-07-17 | End: 2018-10-30 | Stop reason: SDUPTHER

## 2018-07-17 NOTE — TELEPHONE ENCOUNTER
Was the patient seen in the last year in this department? Yes     Does patient have an active prescription for medications requested? No     Received Request Via: Pharmacy    Pt met protocol?: Yes     Last OV 04/2018

## 2018-08-02 DIAGNOSIS — E05.00 GRAVES' DISEASE: ICD-10-CM

## 2018-08-04 RX ORDER — LEVOTHYROXINE SODIUM 0.15 MG/1
TABLET ORAL
Qty: 90 TAB | Refills: 2 | Status: SHIPPED | OUTPATIENT
Start: 2018-08-04 | End: 2018-12-11 | Stop reason: SDUPTHER

## 2018-10-15 ENCOUNTER — OFFICE VISIT (OUTPATIENT)
Dept: URGENT CARE | Facility: PHYSICIAN GROUP | Age: 68
End: 2018-10-15
Payer: COMMERCIAL

## 2018-10-15 VITALS
DIASTOLIC BLOOD PRESSURE: 74 MMHG | SYSTOLIC BLOOD PRESSURE: 130 MMHG | BODY MASS INDEX: 24.4 KG/M2 | HEART RATE: 60 BPM | HEIGHT: 68 IN | TEMPERATURE: 97.6 F | RESPIRATION RATE: 16 BRPM | WEIGHT: 161 LBS | OXYGEN SATURATION: 97 %

## 2018-10-15 DIAGNOSIS — H57.02 ANISOCORIA: ICD-10-CM

## 2018-10-15 DIAGNOSIS — H57.12 LEFT EYE PAIN: ICD-10-CM

## 2018-10-15 PROCEDURE — 99213 OFFICE O/P EST LOW 20 MIN: CPT | Performed by: FAMILY MEDICINE

## 2018-10-15 RX ORDER — GABAPENTIN 100 MG/1
100 CAPSULE ORAL 3 TIMES DAILY
COMMUNITY
End: 2018-12-11 | Stop reason: SDUPTHER

## 2018-10-15 ASSESSMENT — ENCOUNTER SYMPTOMS
DOUBLE VISION: 0
PHOTOPHOBIA: 0

## 2018-10-15 NOTE — LETTER
October 15, 2018         Patient: Kelli Vides   YOB: 1950   Date of Visit: 10/15/2018           To Whom it May Concern:    Kelli Vides was seen in my clinic on 10/15/2018. Please excuse 10/14 through 10/18/2018.    Sincerely,           Raji Byrd M.D.  Electronically Signed

## 2018-10-16 NOTE — PROGRESS NOTES
"Subjective:      Kelli Vides is a 67 y.o. female who presents with Eye Drainage (left eye painful and draining x 2days)            Onset yesterday severe left eye pain with associated blurry vision.  It has subsided somewhat but was severe yesterday.  She had similar episode 3 weeks ago.  She does not wear contact lenses.  She is not diabetic.  No past medical history of glaucoma.  No home remedies tried no other aggravating or alleviating factors..        Review of Systems   HENT: Negative for ear pain.    Eyes: Negative for double vision and photophobia.     .  Medications, Allergies, and current problem list reviewed today in Epic       Objective:     /74 (BP Location: Left arm, Patient Position: Sitting, BP Cuff Size: Adult)   Pulse 60   Temp 36.4 °C (97.6 °F) (Temporal)   Resp 16   Ht 1.727 m (5' 8\")   Wt 73 kg (161 lb)   SpO2 97%   BMI 24.48 kg/m²      Physical Exam   Constitutional: She appears well-developed and well-nourished. No distress.   HENT:   Head: Normocephalic and atraumatic.   Eyes: EOM are normal.   Chronic exophthalmos    Left pupil is mid dilated and slightly sluggish compared to right.  Fundus exam limited but obvious cupping was not noted.   Skin: Skin is warm and dry. No rash noted.               Assessment/Plan:     1. Left eye pain     2. Anisocoria       Differential diagnosis, natural history, supportive care, and indications for immediate follow-up discussed at length.     High suspicion for acute angle-closure glaucoma.  Patient to ER for further evaluation.  Transfer center notified.  "

## 2018-10-30 DIAGNOSIS — M25.561 BILATERAL CHRONIC KNEE PAIN: ICD-10-CM

## 2018-10-30 DIAGNOSIS — E05.00 GRAVES' DISEASE: ICD-10-CM

## 2018-10-30 DIAGNOSIS — M25.562 BILATERAL CHRONIC KNEE PAIN: ICD-10-CM

## 2018-10-30 DIAGNOSIS — M54.50 CHRONIC BILATERAL LOW BACK PAIN WITHOUT SCIATICA: ICD-10-CM

## 2018-10-30 DIAGNOSIS — M25.512 CHRONIC LEFT SHOULDER PAIN: ICD-10-CM

## 2018-10-30 DIAGNOSIS — G89.29 CHRONIC BILATERAL LOW BACK PAIN WITHOUT SCIATICA: ICD-10-CM

## 2018-10-30 DIAGNOSIS — G89.29 CHRONIC LEFT SHOULDER PAIN: ICD-10-CM

## 2018-10-30 DIAGNOSIS — G89.29 BILATERAL CHRONIC KNEE PAIN: ICD-10-CM

## 2018-10-31 ENCOUNTER — TELEPHONE (OUTPATIENT)
Dept: MEDICAL GROUP | Facility: PHYSICIAN GROUP | Age: 68
End: 2018-10-31

## 2018-10-31 RX ORDER — CYCLOBENZAPRINE HCL 10 MG
TABLET ORAL
Qty: 60 TAB | Refills: 0 | Status: SHIPPED | OUTPATIENT
Start: 2018-10-31 | End: 2018-12-11 | Stop reason: SDUPTHER

## 2018-10-31 RX ORDER — LEVOTHYROXINE SODIUM 0.15 MG/1
TABLET ORAL
Refills: 0 | OUTPATIENT
Start: 2018-10-31

## 2018-10-31 NOTE — TELEPHONE ENCOUNTER
Was the patient seen in the last year in this department? Yes    Does patient have an active prescription for medications requested? No     Received Request Via: Pharmacy      Pt met protocol?: Yes    LAST OV 04/24/2018

## 2018-10-31 NOTE — TELEPHONE ENCOUNTER
VOICEMAIL  1. Caller Name: Kelli                       Call Back Number: [phone] (home)      2. Message: Has FMLA paperwork that needs to be filled out today and wants to drop it off    3. Patient approves office to leave a detailed voicemail/MyChart message: N\A    [facility]

## 2018-10-31 NOTE — TELEPHONE ENCOUNTER
"Pt came into clinic and was \"checking in\" Simran who told the pt that she needs an appointment to have paperwork done. Pt was also informed of this on the phone by Simran prior to arrival. Pt was scheduled an appointment for 11/19/18.   "

## 2018-11-01 ENCOUNTER — TELEPHONE (OUTPATIENT)
Dept: MEDICAL GROUP | Facility: PHYSICIAN GROUP | Age: 68
End: 2018-11-01

## 2018-11-01 NOTE — TELEPHONE ENCOUNTER
VOICEMAIL  1. Caller Name: Kelli                       Call Back Number: [phone] (home)      2. Message: Wants to know if  has filled out FMLA paperwork yet and if so could she fax it over.    3. Patient approves office to leave a detailed voicemail/MyChart message: N\A    Called pt back and informed her that she has to have an appointment to fill out paperwork, especially FMLA paperwork per office policy.

## 2018-11-02 DIAGNOSIS — J30.2 SEASONAL ALLERGIC RHINITIS DUE TO FUNGAL SPORES: ICD-10-CM

## 2018-11-05 RX ORDER — FLUTICASONE PROPIONATE 50 MCG
SPRAY, SUSPENSION (ML) NASAL
Qty: 3 BOTTLE | Refills: 1 | Status: SHIPPED | OUTPATIENT
Start: 2018-11-05 | End: 2018-12-11 | Stop reason: SDUPTHER

## 2018-11-19 ENCOUNTER — OFFICE VISIT (OUTPATIENT)
Dept: MEDICAL GROUP | Facility: PHYSICIAN GROUP | Age: 68
End: 2018-11-19
Payer: COMMERCIAL

## 2018-11-19 VITALS
SYSTOLIC BLOOD PRESSURE: 128 MMHG | TEMPERATURE: 97.1 F | BODY MASS INDEX: 25.46 KG/M2 | HEIGHT: 68 IN | WEIGHT: 168 LBS | DIASTOLIC BLOOD PRESSURE: 68 MMHG | HEART RATE: 63 BPM | OXYGEN SATURATION: 100 %

## 2018-11-19 DIAGNOSIS — H57.89: ICD-10-CM

## 2018-11-19 PROCEDURE — 99214 OFFICE O/P EST MOD 30 MIN: CPT | Performed by: NURSE PRACTITIONER

## 2018-11-19 ASSESSMENT — PATIENT HEALTH QUESTIONNAIRE - PHQ9: CLINICAL INTERPRETATION OF PHQ2 SCORE: 0

## 2018-11-20 NOTE — ASSESSMENT & PLAN NOTE
Is under the care of ophthalmology for an issue that causes eye swelling.  Missed 3 days of work as a result, and needs [facility] paperwork completed.  This seems to happen when she encounters a stress on her body.  Discussed plan to complete paperwork for the initial 3 days plus intermittent flare ups.

## 2018-11-20 NOTE — PROGRESS NOTES
Chief Complaint   Patient presents with   • Medication Refill   • Eye Problem     Left eye       HISTORY OF PRESENT ILLNESS: Patient is a 67 y.o. female established patient of Dr. Mcfadden who presents today to discuss the following issues:    Inflammatory disorder of eye  Is under the care of ophthalmology for an issue that causes eye swelling.  Missed 3 days of work as a result, and needs [facility] paperwork completed.  This seems to happen when she encounters a stress on her body.  Discussed plan to complete paperwork for the initial 3 days plus intermittent flare ups.      Patient Active Problem List    Diagnosis Date Noted   • Inflammatory disorder of eye 11/19/2018   • Abnormal drug screen 08/28/2017   • DDD (degenerative disc disease), lumbar 08/26/2015   • Chronic left shoulder pain 07/09/2015   • Current smoker 04/18/2013   • Prediabetes 11/04/2011   • Hypothyroid 11/04/2011   • Graves' disease 04/07/2011   • Hypertension 04/07/2011   • Anxiety 04/07/2011   • Chronic back pain 04/07/2011   • Hyperlipidemia 04/07/2011       Allergies:Patient has no known allergies.    Current Outpatient Prescriptions   Medication Sig Dispense Refill   • fluticasone (FLONASE) 50 MCG/ACT nasal spray USE 1 TO 2 SPRAYS IN EACH NOSTRIL EVERY DAY 3 Bottle 1   • cyclobenzaprine (FLEXERIL) 10 MG Tab TAKE 1 TABLET BY MOUTH TWICE DAILY AS NEEDED FOR MUSCLE SPASMS 60 Tab 0   • atenolol (TENORMIN) 50 MG Tab Take 1 Tab by mouth every day. 90 Tab 3   • atorvastatin (LIPITOR) 40 MG Tab Take 1 Tab by mouth every day. 90 Tab 3   • gabapentin (NEURONTIN) 100 MG Cap Take 100 mg by mouth 3 times a day.     • levothyroxine (SYNTHROID) 150 MCG Tab TAKE 1 TABLET BY MOUTH EVERY MORNING ON AN EMPTY STOMACH 90 Tab 2   • varenicline (CHANTIX JIMMY) 0.5 MG X 11 & 1 MG X 42 tablet 0.5 mg daily for 3 days, then 0.5 mg twice daily for 4 days, then 1 mg twice daily for 11 weeks. (Patient not taking: Reported on 11/19/2018) 56 Tab 3     No current  "facility-administered medications for this visit.        Social History   Substance Use Topics   • Smoking status: Current Every Day Smoker     Packs/day: 0.25     Years: 30.00   • Smokeless tobacco: Never Used   • Alcohol use 1.8 oz/week     3 Cans of beer per week       Family Status   Relation Status   • Mo    • Sis Alive   • Bro    • Fa    • Neg Hx (Not Specified)     Family History   Problem Relation Age of Onset   • Diabetes Mother    • Hypertension Mother    • Diabetes Sister    • Diabetes Brother    • Alcohol abuse Father    • Cancer Neg Hx    • Heart Attack Neg Hx    • Heart Disease Neg Hx        Review of Systems:   Constitutional: Negative for fever, chills, weight loss and malaise/fatigue.   HENT: Negative for ear pain, nosebleeds, congestion, sore throat and neck pain.  Positive for intermittent episodes of eye swelling and decreased visual acuity.  Eyes: Negative for blurred vision.   Respiratory: Negative for cough, sputum production, shortness of breath and wheezing.    Cardiovascular: Negative for chest pain, palpitations, orthopnea and leg swelling.   Gastrointestinal: Negative for heartburn, nausea, vomiting and abdominal pain.   Genitourinary: Negative for dysuria, urgency and frequency.   Musculoskeletal: Negative for myalgias, joint pain, and back pain.  Skin: Negative for rash and itching.   Neurological: Negative for dizziness, tingling, tremors, sensory change, focal weakness and headaches.   Endo/Heme/Allergies: Does not bruise/bleed easily.   Psychiatric/Behavioral: Negative for depression, suicidal ideas and memory loss.  The patient is not nervous/anxious and does not have insomnia.    All other systems reviewed and are negative except as in HPI.    Exam:  Blood pressure 128/68, pulse 63, temperature 36.2 °C (97.1 °F), height 1.727 m (5' 8\"), weight 76.2 kg (168 lb), SpO2 100 %.  General:  Well nourished, well developed female in NAD  Head: Grossly normal.  Neck: " Supple without JVD or bruit. Thyroid is not enlarged.  Pulmonary: Clear to ausculation. Normal effort. No rales, ronchi, or wheezing.  Cardiovascular: Regular rate and rhythm without murmur.   Abdomen:  Soft, nontender, nondistended.  Extremities: No clubbing, cyanosis, or edema.  Skin: Intact with no obvious rashes or lesions.  Neuro: Grossly intact.  Psych: Alert and oriented x 3.  Mood and affect appropriate.    Medical decision-making and discussion: Kelli is here today to discuss an inflammatory eye issue.  [facility] paperwork was completed, and she will keep all appointment with her eye doctor.  She will follow-up here as needed.          Assessment/Plan:  1. Inflammatory disorder of eye         Return if symptoms worsen or fail to improve.    Please note that this dictation was created using voice recognition software. I have made every reasonable attempt to correct obvious errors, but I expect that there are errors of grammar and possibly content that I did not discover before finalizing the note.

## 2018-12-11 ENCOUNTER — OFFICE VISIT (OUTPATIENT)
Dept: MEDICAL GROUP | Facility: PHYSICIAN GROUP | Age: 68
End: 2018-12-11
Payer: COMMERCIAL

## 2018-12-11 VITALS
RESPIRATION RATE: 16 BRPM | BODY MASS INDEX: 24.4 KG/M2 | TEMPERATURE: 98.1 F | DIASTOLIC BLOOD PRESSURE: 92 MMHG | OXYGEN SATURATION: 99 % | SYSTOLIC BLOOD PRESSURE: 138 MMHG | HEIGHT: 68 IN | WEIGHT: 161 LBS | HEART RATE: 64 BPM

## 2018-12-11 DIAGNOSIS — Z00.00 ANNUAL PHYSICAL EXAM: ICD-10-CM

## 2018-12-11 DIAGNOSIS — M25.561 BILATERAL CHRONIC KNEE PAIN: ICD-10-CM

## 2018-12-11 DIAGNOSIS — J30.2 SEASONAL ALLERGIC RHINITIS DUE TO FUNGAL SPORES: ICD-10-CM

## 2018-12-11 DIAGNOSIS — E78.5 HYPERLIPIDEMIA, UNSPECIFIED HYPERLIPIDEMIA TYPE: ICD-10-CM

## 2018-12-11 DIAGNOSIS — I10 ESSENTIAL HYPERTENSION: ICD-10-CM

## 2018-12-11 DIAGNOSIS — E05.00 GRAVES' DISEASE: ICD-10-CM

## 2018-12-11 DIAGNOSIS — G89.29 BILATERAL CHRONIC KNEE PAIN: ICD-10-CM

## 2018-12-11 DIAGNOSIS — M25.562 BILATERAL CHRONIC KNEE PAIN: ICD-10-CM

## 2018-12-11 DIAGNOSIS — Z23 NEED FOR VACCINATION: ICD-10-CM

## 2018-12-11 DIAGNOSIS — E03.9 HYPOTHYROIDISM, UNSPECIFIED TYPE: ICD-10-CM

## 2018-12-11 PROCEDURE — 90471 IMMUNIZATION ADMIN: CPT | Performed by: INTERNAL MEDICINE

## 2018-12-11 PROCEDURE — 99397 PER PM REEVAL EST PAT 65+ YR: CPT | Mod: 25 | Performed by: INTERNAL MEDICINE

## 2018-12-11 PROCEDURE — 90662 IIV NO PRSV INCREASED AG IM: CPT | Performed by: INTERNAL MEDICINE

## 2018-12-11 RX ORDER — ATENOLOL 50 MG/1
50 TABLET ORAL
Qty: 90 TAB | Refills: 3 | Status: SHIPPED | OUTPATIENT
Start: 2018-12-11 | End: 2019-05-23 | Stop reason: SDUPTHER

## 2018-12-11 RX ORDER — ATORVASTATIN CALCIUM 40 MG/1
40 TABLET, FILM COATED ORAL
Qty: 90 TAB | Refills: 1 | Status: SHIPPED | OUTPATIENT
Start: 2018-12-11 | End: 2019-05-23 | Stop reason: SDUPTHER

## 2018-12-11 RX ORDER — GABAPENTIN 100 MG/1
100 CAPSULE ORAL 3 TIMES DAILY
Qty: 270 CAP | Refills: 1 | Status: SHIPPED | OUTPATIENT
Start: 2018-12-11 | End: 2019-06-16 | Stop reason: SDUPTHER

## 2018-12-11 RX ORDER — CYCLOBENZAPRINE HCL 10 MG
10 TABLET ORAL 2 TIMES DAILY PRN
Qty: 60 TAB | Refills: 0 | Status: SHIPPED | OUTPATIENT
Start: 2018-12-11 | End: 2019-05-23 | Stop reason: SDUPTHER

## 2018-12-11 RX ORDER — FLUTICASONE PROPIONATE 50 MCG
1 SPRAY, SUSPENSION (ML) NASAL DAILY
Qty: 3 BOTTLE | Refills: 1 | Status: SHIPPED | OUTPATIENT
Start: 2018-12-11 | End: 2019-05-23 | Stop reason: SDUPTHER

## 2018-12-11 RX ORDER — LEVOTHYROXINE SODIUM 0.15 MG/1
150 TABLET ORAL EVERY MORNING
Qty: 90 TAB | Refills: 1 | Status: SHIPPED | OUTPATIENT
Start: 2018-12-11 | End: 2019-05-23 | Stop reason: SDUPTHER

## 2018-12-11 NOTE — ASSESSMENT & PLAN NOTE
Following with [facility] on [address]. Was seen recently for eye pain. Was told by her eye doctor that due to her Grave's disease she is prone to tear duct plugging occasionally. Follows up with her eye doctor tomorrow, was given drops. Otherwise, she is doing well but still continues to smoke. Not interested in smoking cessation.

## 2018-12-11 NOTE — PROGRESS NOTES
PRIMARY CARE CLINIC ANNUAL EXAM  Chief Complaint   Patient presents with   • Annual Exam       History of Present Illness     Annual physical exam  Following with [facility] on [address]. Was seen recently for eye pain. Was told by her eye doctor that due to her Grave's disease she is prone to tear duct plugging occasionally. Follows up with her eye doctor tomorrow, was given drops. Otherwise, she is doing well but still continues to smoke. Not interested in smoking cessation.       Current Outpatient Prescriptions   Medication Sig Dispense Refill   • Tetrahydrozoline HCl (EYE DROPS OP) by Ophthalmic route.     • atorvastatin (LIPITOR) 40 MG Tab Take 1 Tab by mouth every day. 90 Tab 1   • atenolol (TENORMIN) 50 MG Tab Take 1 Tab by mouth every day. 90 Tab 3   • levothyroxine (SYNTHROID) 150 MCG Tab Take 1 Tab by mouth every morning. ON A EMPTY STOMACH 90 Tab 1   • gabapentin (NEURONTIN) 100 MG Cap Take 1 Cap by mouth 3 times a day. 270 Cap 1   • cyclobenzaprine (FLEXERIL) 10 MG Tab Take 1 Tab by mouth 2 times a day as needed. FOR MUSCLE SPASMS 60 Tab 0   • fluticasone (FLONASE) 50 MCG/ACT nasal spray Spray 1 Spray in nose every day. 3 Bottle 1     No current facility-administered medications for this visit.      Past Medical History:   Diagnosis Date   • Active smoker 4/18/2013   • Anxiety 4/7/2011   • Chronic back pain 4/7/2011   • Chronic pain 4/7/2011   • Grave's disease    • Graves' disease 4/7/2011   • Hyperlipidemia    • Hypertension      Past Surgical History:   Procedure Laterality Date   • TONSILLECTOMY AND ADENOIDECTOMY       Social History   Substance Use Topics   • Smoking status: Current Every Day Smoker     Packs/day: 0.25     Years: 30.00   • Smokeless tobacco: Never Used   • Alcohol use 1.8 oz/week     3 Cans of beer per week     Social History     Social History Narrative    [employer] [facility] [employer]      Family History   Problem Relation Age of Onset   • Diabetes Mother    • Hypertension Mother   "  • Diabetes Sister    • Diabetes Brother    • Alcohol abuse Father    • Cancer Neg Hx    • Heart Attack Neg Hx    • Heart Disease Neg Hx      Family Status   Relation Status   • Mo    • Sis Alive   • Bro    • Fa    • Neg Hx (Not Specified)     Allergies: Patient has no known allergies.    ROS  As per HPI above. All other systems reviewed and negative.        Objective   Blood pressure 138/92, pulse 64, temperature 36.7 °C (98.1 °F), temperature source Temporal, resp. rate 16, height 1.727 m (5' 8\"), weight 73 kg (161 lb), SpO2 99 %. Body mass index is 24.48 kg/m².    General: alert and oriented, pleasant, cooperative  HEENT: Normocephalic, atraumatic. PERRLA. No thyroid masses. Oropharynx clear without exudate or injection. Exophthalmos   Cardiovascular: regular rate and rhythm, normal S1/S2  Pulmonary: lungs clear to auscultation bilaterally  Gastrointestinal: no tenderness to palpation. No hepatosplenomegaly. Bowel sounds normoactive  Lymphatics: no cervical or supraclavicular lymphadenopathy   Skin: warm and dry, no lesions or rashes  Psychiatric: appropriate mood and affect. Good insight and appropriate judgment       Assessment and Plan   The following treatment plan was discussed     1. Need for vaccination  - INFLUENZA VACCINE, HIGH DOSE (65+ ONLY)    2. Graves' disease  - VITAMIN D,25 HYDROXY; Future  - TSH WITH REFLEX TO FT4; Future  - levothyroxine (SYNTHROID) 150 MCG Tab; Take 1 Tab by mouth every morning. ON A EMPTY STOMACH  Dispense: 90 Tab; Refill: 1    3. Hypothyroidism, unspecified type  - gabapentin (NEURONTIN) 100 MG Cap; Take 1 Cap by mouth 3 times a day.  Dispense: 270 Cap; Refill: 1    4. Essential hypertension  - COMP METABOLIC PANEL; Future  - CBC WITH DIFFERENTIAL; Future  - atenolol (TENORMIN) 50 MG Tab; Take 1 Tab by mouth every day.  Dispense: 90 Tab; Refill: 3    5. Hyperlipidemia, unspecified hyperlipidemia type  - Lipid Profile; Future  - atorvastatin (LIPITOR) " 40 MG Tab; Take 1 Tab by mouth every day.  Dispense: 90 Tab; Refill: 1    6. Bilateral chronic knee pain  - cyclobenzaprine (FLEXERIL) 10 MG Tab; Take 1 Tab by mouth 2 times a day as needed. FOR MUSCLE SPASMS  Dispense: 60 Tab; Refill: 0    7. Seasonal allergic rhinitis due to fungal spores  - fluticasone (FLONASE) 50 MCG/ACT nasal spray; Spray 1 Spray in nose every day.  Dispense: 3 Bottle; Refill: 1    8. Annual physical exam  Kelli German is doing well. Has been following with optometry given the exophthalmos related complications, sees her optometrist again tomorrow.       Healthcare maintenance     Health Maintenance Due   Topic Date Due   • IMM DTaP/Tdap/Td Vaccine (1 - Tdap) 04/05/2011   • BONE DENSITY  12/10/2016   • MAMMOGRAM  05/23/2017   • IMM INFLUENZA (1) 09/01/2018     Given her orders for mammogram and colonoscopy from 2/2018     Return in about 6 months (around 6/11/2019).    Demetrio Mcfadden MD  Internal Medicine  [facility]

## 2018-12-11 NOTE — ASSESSMENT & PLAN NOTE
Following with [facility] on [address]. Was seen recently for eye pain. Was told by her eye doctor that due to her Grave's disease she is prone to tear duct plugging occasionally. Follows up with her eye doctor tomorrow, was given drops.

## 2019-01-21 ENCOUNTER — HOSPITAL ENCOUNTER (OUTPATIENT)
Dept: LAB | Facility: MEDICAL CENTER | Age: 69
End: 2019-01-21
Attending: INTERNAL MEDICINE
Payer: COMMERCIAL

## 2019-01-21 DIAGNOSIS — I10 ESSENTIAL HYPERTENSION: ICD-10-CM

## 2019-01-21 DIAGNOSIS — E78.5 HYPERLIPIDEMIA, UNSPECIFIED HYPERLIPIDEMIA TYPE: ICD-10-CM

## 2019-01-21 DIAGNOSIS — E05.00 GRAVES' DISEASE: ICD-10-CM

## 2019-01-21 LAB
25(OH)D3 SERPL-MCNC: 9 NG/ML (ref 30–100)
ALBUMIN SERPL BCP-MCNC: 4.1 G/DL (ref 3.2–4.9)
ALBUMIN/GLOB SERPL: 1.5 G/DL
ALP SERPL-CCNC: 78 U/L (ref 30–99)
ALT SERPL-CCNC: 16 U/L (ref 2–50)
ANION GAP SERPL CALC-SCNC: 7 MMOL/L (ref 0–11.9)
AST SERPL-CCNC: 27 U/L (ref 12–45)
BASOPHILS # BLD AUTO: 0.6 % (ref 0–1.8)
BASOPHILS # BLD: 0.03 K/UL (ref 0–0.12)
BILIRUB SERPL-MCNC: 0.2 MG/DL (ref 0.1–1.5)
BUN SERPL-MCNC: 16 MG/DL (ref 8–22)
CALCIUM SERPL-MCNC: 9.2 MG/DL (ref 8.5–10.5)
CHLORIDE SERPL-SCNC: 110 MMOL/L (ref 96–112)
CHOLEST SERPL-MCNC: 228 MG/DL (ref 100–199)
CO2 SERPL-SCNC: 20 MMOL/L (ref 20–33)
CREAT SERPL-MCNC: 0.57 MG/DL (ref 0.5–1.4)
EOSINOPHIL # BLD AUTO: 0.17 K/UL (ref 0–0.51)
EOSINOPHIL NFR BLD: 3.5 % (ref 0–6.9)
ERYTHROCYTE [DISTWIDTH] IN BLOOD BY AUTOMATED COUNT: 53.1 FL (ref 35.9–50)
FASTING STATUS PATIENT QL REPORTED: NORMAL
GLOBULIN SER CALC-MCNC: 2.7 G/DL (ref 1.9–3.5)
GLUCOSE SERPL-MCNC: 88 MG/DL (ref 65–99)
HCT VFR BLD AUTO: 47.7 % (ref 37–47)
HDLC SERPL-MCNC: 54 MG/DL
HGB BLD-MCNC: 15.3 G/DL (ref 12–16)
IMM GRANULOCYTES # BLD AUTO: 0.03 K/UL (ref 0–0.11)
IMM GRANULOCYTES NFR BLD AUTO: 0.6 % (ref 0–0.9)
LDLC SERPL CALC-MCNC: 146 MG/DL
LYMPHOCYTES # BLD AUTO: 2.35 K/UL (ref 1–4.8)
LYMPHOCYTES NFR BLD: 47.8 % (ref 22–41)
MCH RBC QN AUTO: 33 PG (ref 27–33)
MCHC RBC AUTO-ENTMCNC: 32.1 G/DL (ref 33.6–35)
MCV RBC AUTO: 102.8 FL (ref 81.4–97.8)
MONOCYTES # BLD AUTO: 0.31 K/UL (ref 0–0.85)
MONOCYTES NFR BLD AUTO: 6.3 % (ref 0–13.4)
NEUTROPHILS # BLD AUTO: 2.03 K/UL (ref 2–7.15)
NEUTROPHILS NFR BLD: 41.2 % (ref 44–72)
NRBC # BLD AUTO: 0 K/UL
NRBC BLD-RTO: 0 /100 WBC
PLATELET # BLD AUTO: 154 K/UL (ref 164–446)
PMV BLD AUTO: 10.9 FL (ref 9–12.9)
POTASSIUM SERPL-SCNC: 4.1 MMOL/L (ref 3.6–5.5)
PROT SERPL-MCNC: 6.8 G/DL (ref 6–8.2)
RBC # BLD AUTO: 4.64 M/UL (ref 4.2–5.4)
SODIUM SERPL-SCNC: 137 MMOL/L (ref 135–145)
TRIGL SERPL-MCNC: 141 MG/DL (ref 0–149)
TSH SERPL DL<=0.005 MIU/L-ACNC: 3.84 UIU/ML (ref 0.38–5.33)
WBC # BLD AUTO: 4.9 K/UL (ref 4.8–10.8)

## 2019-01-21 PROCEDURE — 84443 ASSAY THYROID STIM HORMONE: CPT

## 2019-01-21 PROCEDURE — 36415 COLL VENOUS BLD VENIPUNCTURE: CPT

## 2019-01-21 PROCEDURE — 80053 COMPREHEN METABOLIC PANEL: CPT

## 2019-01-21 PROCEDURE — 85025 COMPLETE CBC W/AUTO DIFF WBC: CPT

## 2019-01-21 PROCEDURE — 82306 VITAMIN D 25 HYDROXY: CPT

## 2019-01-21 PROCEDURE — 80061 LIPID PANEL: CPT

## 2019-01-22 ENCOUNTER — TELEPHONE (OUTPATIENT)
Dept: MEDICAL GROUP | Facility: PHYSICIAN GROUP | Age: 69
End: 2019-01-22

## 2019-01-22 DIAGNOSIS — R79.89 LOW VITAMIN D LEVEL: ICD-10-CM

## 2019-01-22 RX ORDER — ERGOCALCIFEROL 1.25 MG/1
50000 CAPSULE ORAL
Qty: 12 CAP | Refills: 0 | Status: SHIPPED | OUTPATIENT
Start: 2019-01-22 | End: 2019-04-20 | Stop reason: SDUPTHER

## 2019-04-20 DIAGNOSIS — R79.89 LOW VITAMIN D LEVEL: ICD-10-CM

## 2019-04-23 RX ORDER — ERGOCALCIFEROL 1.25 MG/1
CAPSULE ORAL
Qty: 12 CAP | Refills: 0 | Status: SHIPPED | OUTPATIENT
Start: 2019-04-23 | End: 2019-07-15 | Stop reason: SDUPTHER

## 2019-04-23 NOTE — TELEPHONE ENCOUNTER
Was the patient seen in the last year in this department? Yes    Does patient have an active prescription for medications requested? No     Received Request Via: Pharmacy    Pt met protocol?: Yes     Last OV 12/11/2018    Last vitamin d lab done 01/21/2019 with a value of 9

## 2019-05-23 ENCOUNTER — OFFICE VISIT (OUTPATIENT)
Dept: MEDICAL GROUP | Facility: PHYSICIAN GROUP | Age: 69
End: 2019-05-23
Payer: COMMERCIAL

## 2019-05-23 VITALS
WEIGHT: 161 LBS | SYSTOLIC BLOOD PRESSURE: 130 MMHG | DIASTOLIC BLOOD PRESSURE: 90 MMHG | BODY MASS INDEX: 24.4 KG/M2 | RESPIRATION RATE: 16 BRPM | HEIGHT: 68 IN | HEART RATE: 55 BPM | OXYGEN SATURATION: 97 % | TEMPERATURE: 98.1 F

## 2019-05-23 DIAGNOSIS — Z78.0 POST-MENOPAUSAL: ICD-10-CM

## 2019-05-23 DIAGNOSIS — E78.2 MIXED HYPERLIPIDEMIA: ICD-10-CM

## 2019-05-23 DIAGNOSIS — J30.2 SEASONAL ALLERGIC RHINITIS DUE TO FUNGAL SPORES: ICD-10-CM

## 2019-05-23 DIAGNOSIS — M25.561 BILATERAL CHRONIC KNEE PAIN: ICD-10-CM

## 2019-05-23 DIAGNOSIS — E78.5 HYPERLIPIDEMIA, UNSPECIFIED HYPERLIPIDEMIA TYPE: ICD-10-CM

## 2019-05-23 DIAGNOSIS — G89.29 BILATERAL CHRONIC KNEE PAIN: ICD-10-CM

## 2019-05-23 DIAGNOSIS — Z12.39 BREAST CANCER SCREENING: ICD-10-CM

## 2019-05-23 DIAGNOSIS — I10 ESSENTIAL HYPERTENSION: ICD-10-CM

## 2019-05-23 DIAGNOSIS — M25.562 BILATERAL CHRONIC KNEE PAIN: ICD-10-CM

## 2019-05-23 DIAGNOSIS — E05.00 GRAVES' DISEASE: ICD-10-CM

## 2019-05-23 DIAGNOSIS — R79.89 LOW VITAMIN D LEVEL: ICD-10-CM

## 2019-05-23 DIAGNOSIS — L72.3 SEBACEOUS CYST: ICD-10-CM

## 2019-05-23 PROCEDURE — 99213 OFFICE O/P EST LOW 20 MIN: CPT | Performed by: INTERNAL MEDICINE

## 2019-05-23 RX ORDER — ATORVASTATIN CALCIUM 40 MG/1
40 TABLET, FILM COATED ORAL
Qty: 90 TAB | Refills: 3 | Status: SHIPPED | OUTPATIENT
Start: 2019-05-23 | End: 2019-07-23 | Stop reason: SDUPTHER

## 2019-05-23 RX ORDER — FLUTICASONE PROPIONATE 50 MCG
1 SPRAY, SUSPENSION (ML) NASAL DAILY
Qty: 3 BOTTLE | Refills: 1 | Status: SHIPPED | OUTPATIENT
Start: 2019-05-23 | End: 2019-07-23 | Stop reason: SDUPTHER

## 2019-05-23 RX ORDER — ATENOLOL 50 MG/1
50 TABLET ORAL
Qty: 90 TAB | Refills: 3 | Status: SHIPPED | OUTPATIENT
Start: 2019-05-23 | End: 2019-07-23 | Stop reason: SDUPTHER

## 2019-05-23 RX ORDER — CYCLOBENZAPRINE HCL 10 MG
10 TABLET ORAL 2 TIMES DAILY PRN
Qty: 60 TAB | Refills: 0 | Status: SHIPPED | OUTPATIENT
Start: 2019-05-23 | End: 2019-07-23 | Stop reason: SDUPTHER

## 2019-05-23 RX ORDER — LEVOTHYROXINE SODIUM 0.15 MG/1
150 TABLET ORAL EVERY MORNING
Qty: 90 TAB | Refills: 3 | Status: SHIPPED | OUTPATIENT
Start: 2019-05-23 | End: 2019-07-23 | Stop reason: SDUPTHER

## 2019-05-23 ASSESSMENT — PATIENT HEALTH QUESTIONNAIRE - PHQ9: CLINICAL INTERPRETATION OF PHQ2 SCORE: 0

## 2019-05-23 NOTE — ASSESSMENT & PLAN NOTE
Vitamin D level was 9 as of 1/2019, has been on weekly prescription vitamin D replacement since then.

## 2019-05-23 NOTE — PROGRESS NOTES
PRIMARY CARE CLINIC FOLLOW UP VISIT  Chief Complaint   Patient presents with   • Vitamin D Deficiency   • Graves' Disease   • Hypertension   • Hypothyroidism   • Hyperlipidemia     History of Present Illness     Low vitamin D level  Vitamin D level was 9 as of 1/2019, has been on weekly prescription vitamin D replacement since then.     Hyperlipidemia  Wasn't taking her atorvastatin regularly when she last had her lipids checked 1/2019.     Sebaceous cyst  Has a cyst of her forehead she would like to see dermatology for.     Current Outpatient Prescriptions   Medication Sig Dispense Refill   • atorvastatin (LIPITOR) 40 MG Tab Take 1 Tab by mouth every day. 90 Tab 3   • levothyroxine (SYNTHROID) 150 MCG Tab Take 1 Tab by mouth every morning. ON A EMPTY STOMACH 90 Tab 3   • cyclobenzaprine (FLEXERIL) 10 MG Tab Take 1 Tab by mouth 2 times a day as needed. FOR MUSCLE SPASMS 60 Tab 0   • fluticasone (FLONASE) 50 MCG/ACT nasal spray Spray 1 Spray in nose every day. 3 Bottle 1   • atenolol (TENORMIN) 50 MG Tab Take 1 Tab by mouth every day. 90 Tab 3   • vitamin D, Ergocalciferol, (DRISDOL) 84176 units Cap capsule TAKE ONE CAPSULE BY MOUTH EVERY 7 DAYS 12 Cap 0   • Tetrahydrozoline HCl (EYE DROPS OP) by Ophthalmic route.     • gabapentin (NEURONTIN) 100 MG Cap Take 1 Cap by mouth 3 times a day. 270 Cap 1     No current facility-administered medications for this visit.      Past Medical History:   Diagnosis Date   • Active smoker 4/18/2013   • Anxiety 4/7/2011   • Chronic back pain 4/7/2011   • Chronic pain 4/7/2011   • Grave's disease    • Graves' disease 4/7/2011   • Hyperlipidemia    • Hypertension      Past Surgical History:   Procedure Laterality Date   • TONSILLECTOMY AND ADENOIDECTOMY       Social History   Substance Use Topics   • Smoking status: Current Every Day Smoker     Packs/day: 0.25     Years: 30.00   • Smokeless tobacco: Never Used   • Alcohol use 1.8 oz/week     3 Cans of beer per week     Social History  "    Social History Narrative     at [employer]      Family History   Problem Relation Age of Onset   • Diabetes Mother    • Hypertension Mother    • Diabetes Sister    • Diabetes Brother    • Alcohol abuse Father    • Cancer Neg Hx    • Heart Attack Neg Hx    • Heart Disease Neg Hx      Family Status   Relation Status   • Mo    • Sis Alive   • Bro    • Fa    • Neg Hx (Not Specified)     Allergies: Patient has no known allergies.    ROS  As per HPI above. All other systems reviewed and negative.        Objective   /90   Pulse (!) 55   Temp 36.7 °C (98.1 °F)   Resp 16   Ht 1.727 m (5' 8\")   Wt 73 kg (161 lb)   SpO2 97%  Body mass index is 24.48 kg/m².    General: alert and oriented, pleasant, cooperative  HEENT: Normocephalic, atraumatic.   Psychiatric: appropriate mood and affect. Good insight and appropriate judgment       Assessment and Plan   The following treatment plan was discussed     1. Low vitamin D level  Advised to check vitamin D level once she completes her current prescription dose.     2. Mixed hyperlipidemia  Since she's been more consistent with her atorvastatin will have her recheck her lipids at the time she rechecks her vitamin D.   - Lipid Profile; Future    3. Graves' disease  - TSH WITH REFLEX TO FT4; Future  - levothyroxine (SYNTHROID) 150 MCG Tab; Take 1 Tab by mouth every morning. ON A EMPTY STOMACH  Dispense: 90 Tab; Refill: 3    4. Sebaceous cyst  - REFERRAL TO DERMATOLOGY    5. Breast cancer screening  - MA-SCREENING MAMMO BILAT W/TOMOSYNTHESIS W/CAD; Future    6. Post-menopausal  - DS-BONE DENSITY STUDY (DEXA)    7. Hyperlipidemia, unspecified hyperlipidemia type  - atorvastatin (LIPITOR) 40 MG Tab; Take 1 Tab by mouth every day.  Dispense: 90 Tab; Refill: 3    8. Bilateral chronic knee pain  - cyclobenzaprine (FLEXERIL) 10 MG Tab; Take 1 Tab by mouth 2 times a day as needed. FOR MUSCLE SPASMS  Dispense: 60 Tab; Refill: 0    9. Seasonal allergic " rhinitis due to fungal spores  - fluticasone (FLONASE) 50 MCG/ACT nasal spray; Spray 1 Spray in nose every day.  Dispense: 3 Bottle; Refill: 1    10. Essential hypertension  - atenolol (TENORMIN) 50 MG Tab; Take 1 Tab by mouth every day.  Dispense: 90 Tab; Refill: 3      Healthcare maintenance     Health Maintenance Due   Topic Date Due   • IMM DTaP/Tdap/Td Vaccine (1 - Tdap) 04/05/2011   • BONE DENSITY  12/10/2016   • MAMMOGRAM  05/23/2017   • IMM PNEUMOCOCCAL 65+ (ADULT) LOW/MEDIUM RISK SERIES (2 of 2 - PPSV23) 01/14/2019   • PAP SMEAR  05/23/2019       Return in about 6 months (around 11/23/2019).    Demetrio Mcfadden MD  Internal Medicine  [facility]

## 2019-06-16 DIAGNOSIS — E03.9 HYPOTHYROIDISM, UNSPECIFIED TYPE: ICD-10-CM

## 2019-06-18 RX ORDER — GABAPENTIN 100 MG/1
CAPSULE ORAL
Qty: 270 CAP | Refills: 0 | Status: SHIPPED | OUTPATIENT
Start: 2019-06-18 | End: 2019-07-23 | Stop reason: SDUPTHER

## 2019-06-18 NOTE — TELEPHONE ENCOUNTER
Was the patient seen in the last year in this department? Yes    Does patient have an active prescription for medications requested? No     Received Request Via: Pharmacy    Pt met protocol?: Yes     Last OV 05/23/19

## 2019-06-18 NOTE — TELEPHONE ENCOUNTER
Pt last seen regarding this issue 5/19. Will send 6 months of fills to pharmacy. Renal function is fine.

## 2019-07-15 DIAGNOSIS — R79.89 LOW VITAMIN D LEVEL: ICD-10-CM

## 2019-07-15 NOTE — TELEPHONE ENCOUNTER
Was the patient seen in the last year in this department? Yes    Does patient have an active prescription for medications requested? No     Received Request Via: Pharmacy      Pt met protocol?: Yes, OV 5/19 labs done 1/19 (LOW)

## 2019-07-16 RX ORDER — ERGOCALCIFEROL 1.25 MG/1
CAPSULE ORAL
Qty: 12 CAP | Refills: 1 | Status: SHIPPED | OUTPATIENT
Start: 2019-07-16 | End: 2019-12-31

## 2019-07-23 ENCOUNTER — OFFICE VISIT (OUTPATIENT)
Dept: MEDICAL GROUP | Facility: PHYSICIAN GROUP | Age: 69
End: 2019-07-23
Payer: COMMERCIAL

## 2019-07-23 VITALS
WEIGHT: 157 LBS | TEMPERATURE: 98.2 F | BODY MASS INDEX: 23.79 KG/M2 | HEART RATE: 56 BPM | RESPIRATION RATE: 16 BRPM | DIASTOLIC BLOOD PRESSURE: 64 MMHG | OXYGEN SATURATION: 96 % | SYSTOLIC BLOOD PRESSURE: 122 MMHG | HEIGHT: 68 IN

## 2019-07-23 DIAGNOSIS — I10 ESSENTIAL HYPERTENSION: ICD-10-CM

## 2019-07-23 DIAGNOSIS — E78.5 HYPERLIPIDEMIA, UNSPECIFIED HYPERLIPIDEMIA TYPE: ICD-10-CM

## 2019-07-23 DIAGNOSIS — M79.10 MUSCLE ACHE: ICD-10-CM

## 2019-07-23 DIAGNOSIS — E05.00 GRAVES' DISEASE: ICD-10-CM

## 2019-07-23 DIAGNOSIS — J30.2 SEASONAL ALLERGIC RHINITIS DUE TO FUNGAL SPORES: ICD-10-CM

## 2019-07-23 DIAGNOSIS — E03.9 HYPOTHYROIDISM, UNSPECIFIED TYPE: ICD-10-CM

## 2019-07-23 PROCEDURE — 99213 OFFICE O/P EST LOW 20 MIN: CPT | Performed by: INTERNAL MEDICINE

## 2019-07-23 RX ORDER — ATORVASTATIN CALCIUM 40 MG/1
40 TABLET, FILM COATED ORAL
Qty: 90 TAB | Refills: 3 | Status: SHIPPED | OUTPATIENT
Start: 2019-07-23 | End: 2020-05-27

## 2019-07-23 RX ORDER — FLUTICASONE PROPIONATE 50 MCG
1 SPRAY, SUSPENSION (ML) NASAL DAILY
Qty: 3 BOTTLE | Refills: 3 | Status: SHIPPED | OUTPATIENT
Start: 2019-07-23 | End: 2019-11-26

## 2019-07-23 RX ORDER — ATENOLOL 50 MG/1
50 TABLET ORAL
Qty: 90 TAB | Refills: 3 | Status: SHIPPED | OUTPATIENT
Start: 2019-07-23 | End: 2020-05-27

## 2019-07-23 RX ORDER — IBUPROFEN 600 MG/1
600 TABLET ORAL
Qty: 90 TAB | Refills: 1 | Status: SHIPPED | OUTPATIENT
Start: 2019-07-23 | End: 2020-07-21

## 2019-07-23 RX ORDER — LEVOTHYROXINE SODIUM 0.15 MG/1
150 TABLET ORAL EVERY MORNING
Qty: 90 TAB | Refills: 3 | Status: SHIPPED | OUTPATIENT
Start: 2019-07-23 | End: 2020-06-26 | Stop reason: SDUPTHER

## 2019-07-23 RX ORDER — CYCLOBENZAPRINE HCL 10 MG
10 TABLET ORAL 2 TIMES DAILY PRN
Qty: 180 TAB | Refills: 2 | Status: SHIPPED | OUTPATIENT
Start: 2019-07-23 | End: 2020-12-17 | Stop reason: SDUPTHER

## 2019-07-23 RX ORDER — GABAPENTIN 100 MG/1
100 CAPSULE ORAL 3 TIMES DAILY
Qty: 270 CAP | Refills: 3 | Status: SHIPPED | OUTPATIENT
Start: 2019-07-23 | End: 2021-03-29

## 2019-07-23 NOTE — ASSESSMENT & PLAN NOTE
Would like a refill of her flexeril and also prescription ibuprofen. Uses prescription ibuprofen sparingly.

## 2019-07-23 NOTE — PROGRESS NOTES
PRIMARY CARE CLINIC FOLLOW UP VISIT  Chief Complaint   Patient presents with   • Vitamin D Deficiency   • Hypertension   • Hyperlipidemia   • Knee Pain     cyclobenzaprine (FLEXERIL) Refill      History of Present Illness     Hypothyroid  TSH normal as of 1/2019, needs refills.     Hypertension  Isn't sure if her insurance plan will relapse with [facility]. Needs refills of her atenolol.     Muscle ache  Would like a refill of her flexeril and also prescription ibuprofen. Uses prescription ibuprofen sparingly.       Current Outpatient Prescriptions   Medication Sig Dispense Refill   • gabapentin (NEURONTIN) 100 MG Cap Take 1 Cap by mouth 3 times a day. 270 Cap 3   • atorvastatin (LIPITOR) 40 MG Tab Take 1 Tab by mouth every day. 90 Tab 3   • levothyroxine (SYNTHROID) 150 MCG Tab Take 1 Tab by mouth every morning. ON A EMPTY STOMACH 90 Tab 3   • cyclobenzaprine (FLEXERIL) 10 MG Tab Take 1 Tab by mouth 2 times a day as needed. FOR MUSCLE SPASMS 180 Tab 2   • fluticasone (FLONASE) 50 MCG/ACT nasal spray Spray 1 Spray in nose every day. 3 Bottle 3   • atenolol (TENORMIN) 50 MG Tab Take 1 Tab by mouth every day. 90 Tab 3   • ibuprofen (MOTRIN) 600 MG Tab Take 1 Tab by mouth 1 time daily as needed. 90 Tab 1   • vitamin D, Ergocalciferol, (DRISDOL) 10104 units Cap capsule TAKE ONE CAPSULE BY MOUTH EVERY 7 DAYS 12 Cap 1   • Tetrahydrozoline HCl (EYE DROPS OP) by Ophthalmic route.       No current facility-administered medications for this visit.      Past Medical History:   Diagnosis Date   • Active smoker 4/18/2013   • Anxiety 4/7/2011   • Chronic back pain 4/7/2011   • Chronic pain 4/7/2011   • Grave's disease    • Graves' disease 4/7/2011   • Hyperlipidemia    • Hypertension      Past Surgical History:   Procedure Laterality Date   • TONSILLECTOMY AND ADENOIDECTOMY       Social History   Substance Use Topics   • Smoking status: Current Every Day Smoker     Packs/day: 0.25     Years: 30.00   • Smokeless tobacco: Never Used  "  • Alcohol use 1.8 oz/week     3 Cans of beer per week     Social History     Social History Narrative     at [employer]      Family History   Problem Relation Age of Onset   • Diabetes Mother    • Hypertension Mother    • Diabetes Sister    • Diabetes Brother    • Alcohol abuse Father    • Cancer Neg Hx    • Heart Attack Neg Hx    • Heart Disease Neg Hx      Family Status   Relation Status   • Mo    • Sis Alive   • Bro    • Fa    • Neg Hx (Not Specified)     Allergies: Patient has no known allergies.    ROS  As per HPI above. All other systems reviewed and negative.        Objective   /64   Pulse (!) 56   Temp 36.8 °C (98.2 °F)   Resp 16   Ht 1.727 m (5' 8\")   Wt 71.2 kg (157 lb)   SpO2 96%  Body mass index is 23.87 kg/m².    General: alert and oriented, pleasant, cooperative  HEENT: Normocephalic, atraumatic.   Psychiatric: appropriate mood and affect. Good insight and appropriate judgment     Assessment and Plan   The following treatment plan was discussed     1. Hypothyroidism, unspecified type  - gabapentin (NEURONTIN) 100 MG Cap; Take 1 Cap by mouth 3 times a day.  Dispense: 270 Cap; Refill: 3    2. Hyperlipidemia, unspecified hyperlipidemia type  - atorvastatin (LIPITOR) 40 MG Tab; Take 1 Tab by mouth every day.  Dispense: 90 Tab; Refill: 3    3. Graves' disease  - levothyroxine (SYNTHROID) 150 MCG Tab; Take 1 Tab by mouth every morning. ON A EMPTY STOMACH  Dispense: 90 Tab; Refill: 3    4. Seasonal allergic rhinitis due to fungal spores  - fluticasone (FLONASE) 50 MCG/ACT nasal spray; Spray 1 Spray in nose every day.  Dispense: 3 Bottle; Refill: 3    5. Essential hypertension  - atenolol (TENORMIN) 50 MG Tab; Take 1 Tab by mouth every day.  Dispense: 90 Tab; Refill: 3    6. Muscle ache  - cyclobenzaprine (FLEXERIL) 10 MG Tab; Take 1 Tab by mouth 2 times a day as needed. FOR MUSCLE SPASMS  Dispense: 180 Tab; Refill: 2  - ibuprofen (MOTRIN) 600 MG Tab; Take 1 Tab by mouth " 1 time daily as needed.  Dispense: 90 Tab; Refill: 1      Healthcare maintenance     Health Maintenance Due   Topic Date Due   • HEPATITIS C SCREENING  1950   • IMM DTaP/Tdap/Td Vaccine (1 - Tdap) 04/05/2011   • BONE DENSITY  12/10/2016   • MAMMOGRAM  05/23/2017   • IMM PNEUMOCOCCAL 65+ (ADULT) LOW/MEDIUM RISK SERIES (2 of 2 - PPSV23) 01/14/2019   • PAP SMEAR  05/23/2019       No Follow-up on file.    Demetrio Mcfadden MD  Internal Medicine  [facility]

## 2019-07-29 ENCOUNTER — HOSPITAL ENCOUNTER (OUTPATIENT)
Dept: LAB | Facility: MEDICAL CENTER | Age: 69
End: 2019-07-29
Attending: INTERNAL MEDICINE
Payer: COMMERCIAL

## 2019-07-29 DIAGNOSIS — R79.89 LOW VITAMIN D LEVEL: ICD-10-CM

## 2019-07-29 DIAGNOSIS — E05.00 GRAVES' DISEASE: ICD-10-CM

## 2019-07-29 DIAGNOSIS — E78.2 MIXED HYPERLIPIDEMIA: ICD-10-CM

## 2019-07-29 LAB
CHOLEST SERPL-MCNC: 173 MG/DL (ref 100–199)
HDLC SERPL-MCNC: 57 MG/DL
LDLC SERPL CALC-MCNC: 84 MG/DL
TRIGL SERPL-MCNC: 160 MG/DL (ref 0–149)

## 2019-07-29 PROCEDURE — 82306 VITAMIN D 25 HYDROXY: CPT

## 2019-07-29 PROCEDURE — 84443 ASSAY THYROID STIM HORMONE: CPT

## 2019-07-29 PROCEDURE — 80061 LIPID PANEL: CPT

## 2019-07-29 PROCEDURE — 36415 COLL VENOUS BLD VENIPUNCTURE: CPT

## 2019-07-29 PROCEDURE — 84439 ASSAY OF FREE THYROXINE: CPT

## 2019-07-30 LAB
25(OH)D3 SERPL-MCNC: 96 NG/ML (ref 30–100)
T4 FREE SERPL-MCNC: 0.99 NG/DL (ref 0.53–1.43)
TSH SERPL DL<=0.005 MIU/L-ACNC: 0.09 UIU/ML (ref 0.38–5.33)

## 2019-09-16 DIAGNOSIS — E03.9 HYPOTHYROIDISM, UNSPECIFIED TYPE: ICD-10-CM

## 2019-09-17 RX ORDER — GABAPENTIN 100 MG/1
CAPSULE ORAL
Qty: 270 CAP | Refills: 3 | Status: SHIPPED | OUTPATIENT
Start: 2019-09-17 | End: 2021-03-29

## 2019-09-17 NOTE — TELEPHONE ENCOUNTER
Was the patient seen in the last year in this department? Yes    Does patient have an active prescription for medications requested? No     Received Request Via: Pharmacy      Pt met protocol?: Yes, last ov 7/19  Rx was sent to same pharmacy on 7/23/19, with 3 refills.

## 2019-12-29 DIAGNOSIS — R79.89 LOW VITAMIN D LEVEL: ICD-10-CM

## 2019-12-29 NOTE — LETTER
Kelli Vides  [address] [address]            12/31/2019        Dear Kelli,      After several attempts, Demetrio Mcfadden M.D.'s office has been unable to reach you by phone regarding your recent medication request.     We ask that you contact us at [phone] at your earliest convenience. Our office hours are from 8:00a - 5:00p Monday thru Friday.    Kind regards,   Manuel Valladares Ass't      Electronically Signed

## 2019-12-31 RX ORDER — ERGOCALCIFEROL 1.25 MG/1
CAPSULE ORAL
Qty: 12 CAP | Refills: 0 | Status: SHIPPED | OUTPATIENT
Start: 2019-12-31 | End: 2020-03-23 | Stop reason: SDUPTHER

## 2020-03-23 DIAGNOSIS — R79.89 LOW VITAMIN D LEVEL: ICD-10-CM

## 2020-03-23 RX ORDER — ERGOCALCIFEROL 1.25 MG/1
50000 CAPSULE ORAL
Qty: 12 CAP | Refills: 0 | Status: SHIPPED | OUTPATIENT
Start: 2020-03-23 | End: 2020-07-02 | Stop reason: SDUPTHER

## 2020-03-23 NOTE — TELEPHONE ENCOUNTER
Was the patient seen in the last year in this department? Yes    Does patient have an active prescription for medications requested? No     Received Request Via: Pharmacy      Pt met protocol?: No  Due for labs  Last ov 7/2019    25-Hydroxy   Vitamin D 25   Date Value Ref Range Status   07/29/2019 96 30 - 100 ng/mL Final     Comment:     Adult Ranges:   <20 ng/mL - Deficiency  20-29 ng/mL - Insufficiency   ng/mL - Sufficiency  The Advia Centaur Vitamin D Assay is standardized to the  [facility] reference measurement procedures, a  reference method for the Vitamin D Standardization Program  (VDSP).  The VDSP aligns patient results among 25 (OH)  Vitamin D methods.

## 2020-05-24 DIAGNOSIS — I10 ESSENTIAL HYPERTENSION: ICD-10-CM

## 2020-05-24 DIAGNOSIS — E78.5 HYPERLIPIDEMIA, UNSPECIFIED HYPERLIPIDEMIA TYPE: ICD-10-CM

## 2020-05-27 RX ORDER — ATENOLOL 50 MG/1
TABLET ORAL
Qty: 90 TAB | Refills: 0 | Status: SHIPPED | OUTPATIENT
Start: 2020-05-27 | End: 2021-11-04 | Stop reason: SDUPTHER

## 2020-05-27 RX ORDER — ATORVASTATIN CALCIUM 40 MG/1
TABLET, FILM COATED ORAL
Qty: 90 TAB | Refills: 0 | Status: SHIPPED | OUTPATIENT
Start: 2020-05-27 | End: 2022-04-01

## 2020-06-26 ENCOUNTER — OFFICE VISIT (OUTPATIENT)
Dept: MEDICAL GROUP | Facility: PHYSICIAN GROUP | Age: 70
End: 2020-06-26
Payer: COMMERCIAL

## 2020-06-26 VITALS
BODY MASS INDEX: 24.27 KG/M2 | RESPIRATION RATE: 14 BRPM | TEMPERATURE: 96.5 F | HEART RATE: 70 BPM | HEIGHT: 67 IN | SYSTOLIC BLOOD PRESSURE: 112 MMHG | WEIGHT: 154.6 LBS | OXYGEN SATURATION: 96 % | DIASTOLIC BLOOD PRESSURE: 82 MMHG

## 2020-06-26 DIAGNOSIS — R73.03 PREDIABETES: Chronic | ICD-10-CM

## 2020-06-26 DIAGNOSIS — G89.29 CHRONIC LOW BACK PAIN WITH BILATERAL SCIATICA, UNSPECIFIED BACK PAIN LATERALITY: ICD-10-CM

## 2020-06-26 DIAGNOSIS — M25.562 CHRONIC PAIN OF BOTH KNEES: ICD-10-CM

## 2020-06-26 DIAGNOSIS — E05.00 GRAVES' DISEASE: ICD-10-CM

## 2020-06-26 DIAGNOSIS — M25.561 CHRONIC PAIN OF BOTH KNEES: ICD-10-CM

## 2020-06-26 DIAGNOSIS — E78.2 MIXED HYPERLIPIDEMIA: Chronic | ICD-10-CM

## 2020-06-26 DIAGNOSIS — E03.9 HYPOTHYROIDISM, UNSPECIFIED TYPE: Chronic | ICD-10-CM

## 2020-06-26 DIAGNOSIS — N95.1 MENOPAUSAL STATE: ICD-10-CM

## 2020-06-26 DIAGNOSIS — Z23 NEED FOR VACCINATION: ICD-10-CM

## 2020-06-26 DIAGNOSIS — F17.209 NICOTINE DEPENDENCE WITH NICOTINE-INDUCED DISORDER, UNSPECIFIED NICOTINE PRODUCT TYPE: Chronic | ICD-10-CM

## 2020-06-26 DIAGNOSIS — I10 ESSENTIAL HYPERTENSION: Chronic | ICD-10-CM

## 2020-06-26 DIAGNOSIS — M54.41 CHRONIC LOW BACK PAIN WITH BILATERAL SCIATICA, UNSPECIFIED BACK PAIN LATERALITY: ICD-10-CM

## 2020-06-26 DIAGNOSIS — Z78.0 POSTMENOPAUSAL STATUS (AGE-RELATED) (NATURAL): ICD-10-CM

## 2020-06-26 DIAGNOSIS — J44.9 CHRONIC OBSTRUCTIVE PULMONARY DISEASE, UNSPECIFIED COPD TYPE (HCC): ICD-10-CM

## 2020-06-26 DIAGNOSIS — M54.42 CHRONIC LOW BACK PAIN WITH BILATERAL SCIATICA, UNSPECIFIED BACK PAIN LATERALITY: ICD-10-CM

## 2020-06-26 DIAGNOSIS — R79.89 LOW VITAMIN D LEVEL: Chronic | ICD-10-CM

## 2020-06-26 DIAGNOSIS — G89.29 CHRONIC PAIN OF BOTH KNEES: ICD-10-CM

## 2020-06-26 PROCEDURE — 90471 IMMUNIZATION ADMIN: CPT | Performed by: INTERNAL MEDICINE

## 2020-06-26 PROCEDURE — 99214 OFFICE O/P EST MOD 30 MIN: CPT | Mod: 25 | Performed by: INTERNAL MEDICINE

## 2020-06-26 PROCEDURE — 90715 TDAP VACCINE 7 YRS/> IM: CPT | Performed by: INTERNAL MEDICINE

## 2020-06-26 PROCEDURE — 90732 PPSV23 VACC 2 YRS+ SUBQ/IM: CPT | Performed by: INTERNAL MEDICINE

## 2020-06-26 PROCEDURE — 90472 IMMUNIZATION ADMIN EACH ADD: CPT | Performed by: INTERNAL MEDICINE

## 2020-06-26 RX ORDER — VARENICLINE TARTRATE 1 MG/1
1 TABLET, FILM COATED ORAL 2 TIMES DAILY
Qty: 60 TAB | Refills: 2 | Status: SHIPPED | OUTPATIENT
Start: 2020-06-26 | End: 2021-03-29 | Stop reason: SDUPTHER

## 2020-06-26 RX ORDER — VARENICLINE TARTRATE 0.5 MG/1
TABLET, FILM COATED ORAL
Qty: 14 TAB | Refills: 0 | Status: SHIPPED | OUTPATIENT
Start: 2020-06-26 | End: 2022-03-28

## 2020-06-26 RX ORDER — LEVOTHYROXINE SODIUM 0.15 MG/1
150 TABLET ORAL EVERY MORNING
Qty: 90 TAB | Refills: 3 | Status: SHIPPED | OUTPATIENT
Start: 2020-06-26 | End: 2020-07-16

## 2020-06-26 RX ORDER — TIOTROPIUM BROMIDE INHALATION SPRAY 1.56 UG/1
2 SPRAY, METERED RESPIRATORY (INHALATION) DAILY
Qty: 1 INHALER | Refills: 6 | Status: SHIPPED | OUTPATIENT
Start: 2020-06-26

## 2020-06-26 RX ORDER — ALBUTEROL SULFATE 90 UG/1
1-2 AEROSOL, METERED RESPIRATORY (INHALATION) EVERY 6 HOURS PRN
Qty: 1 INHALER | Refills: 3 | Status: SHIPPED | OUTPATIENT
Start: 2020-06-26

## 2020-06-26 ASSESSMENT — PATIENT HEALTH QUESTIONNAIRE - PHQ9: CLINICAL INTERPRETATION OF PHQ2 SCORE: 0

## 2020-06-26 ASSESSMENT — FIBROSIS 4 INDEX: FIB4 SCORE: 2.98

## 2020-06-27 NOTE — ASSESSMENT & PLAN NOTE
Chronic condition but the patient is due for lab work.  She is presently taking Lipitor daily.  No side effect reported.

## 2020-06-27 NOTE — PROGRESS NOTES
CC: Bilateral knee pain  Medication refills  Follow-up high blood pressure      HPI: 69 y.o. Patient presents to discuss the following:     Knee pain, bilateral  This is a chronic condition  Patient c.o  knee pain,   Timing: noted > 1year L worse than R  Context: denies recent trauma or injury  Severity: moderate  Quality of pain: sharp  Duration: up to several hrs  Associating symptoms:denies significant motor weakness or paresthesia. Denies F/C  Aggravating factors: knee joint movements, walking, in/out of vehicles        Hypertension  Chronic condition but the patient presently taking atenolol.  Her blood pressure today 1/12/1982.    Hyperlipidemia  Chronic condition but the patient is due for lab work.  She is presently taking Lipitor daily.  No side effect reported.    Prediabetes  This was noted previously.  She is currently on diet therapy.    Hypothyroid  Patient has a history of Graves' disease.  She underwent radioactive iodine treatment in [place] approximately 15 years ago.  Patient is now taking levothyroxine.  She is requesting refill of the medication.  The patient also requests a referral to establish with endocrinologist.    Nicotine dependence  Patient smokes approximately 1 pack/day.  Strongly advised the patient is important to quit.  She is requesting Chantix    Low vitamin D level  Chronic condition.  The patient is taking vitamin D once a week.  She is due for lab work.    COPD (chronic obstructive pulmonary disease) (HCC)  Chronic condition.  The patient still smokes cigarettes approximately 1 pack/day.  Strongly advised the patient to quit smoking completely.          REVIEW OF SYSTEMS:     Constitutional:  no fever / chills   Neurologic: no headaches, no numbness/tingling  Eyes: no changes in vision  ENT: no sore throat, no hearing loss  CV:  no chest pain, no palpitations  Pulmonary: no SOB, no cough    GI: no nausea / vomiting, no diarrhea, no constipation  :  no dysuria, no hematuria    Skin: no rash  Hematologic: no bleeding      Allergies: Patient has no known allergies.    Current Outpatient Medications Ordered in Epic   Medication Sig Dispense Refill   • varenicline (CHANTIX) 0.5 MG tablet Take 0.5 mg once daily on days 1-3 then 0.5mg bid on days 4-7 14 Tab 0   • varenicline (CHANTIX) 1 MG tablet Take 1 Tab by mouth 2 times a day. 60 Tab 2   • levothyroxine (SYNTHROID) 150 MCG Tab Take 1 Tab by mouth every morning. ON A EMPTY STOMACH 90 Tab 3   • Tiotropium Bromide Monohydrate (SPIRIVA RESPIMAT) 1.25 MCG/ACT Aero Soln Inhale 2 Puffs by mouth every day. 1 Inhaler 6   • albuterol 108 (90 Base) MCG/ACT Aero Soln inhalation aerosol Inhale 1-2 Puffs by mouth every 6 hours as needed for Shortness of Breath. 1 Inhaler 3   • atenolol (TENORMIN) 50 MG Tab TAKE 1 TABLET BY MOUTH EVERY DAY 90 Tab 0   • atorvastatin (LIPITOR) 40 MG Tab TAKE 1 TABLET BY MOUTH EVERY DAY 90 Tab 0   • vitamin D, Ergocalciferol, (DRISDOL) 1.25 MG (11782 UT) Cap capsule Take 1 Cap by mouth every 7 days. 12 Cap 0   • fluticasone (FLONASE) 50 MCG/ACT nasal spray USE 1 SPRAY IN EACH NOSTRIL EVERY DAY 3 Bottle 0   • gabapentin (NEURONTIN) 100 MG Cap TAKE ONE CAPSULE BY MOUTH THREE TIMES DAILY 270 Cap 3   • gabapentin (NEURONTIN) 100 MG Cap Take 1 Cap by mouth 3 times a day. 270 Cap 3   • cyclobenzaprine (FLEXERIL) 10 MG Tab Take 1 Tab by mouth 2 times a day as needed. FOR MUSCLE SPASMS 180 Tab 2   • ibuprofen (MOTRIN) 600 MG Tab Take 1 Tab by mouth 1 time daily as needed. 90 Tab 1   • Tetrahydrozoline HCl (EYE DROPS OP) by Ophthalmic route.       No current [facility]-ordered facility-administered medications on file.        Past Medical History:   Diagnosis Date   • Active smoker 4/18/2013   • Anxiety 4/7/2011   • Chronic back pain 4/7/2011   • Chronic pain 4/7/2011   • Grave's disease    • Graves' disease 4/7/2011   • Hyperlipidemia    • Hypertension         Past Surgical History:   Procedure Laterality Date   • TONSILLECTOMY AND  ADENOIDECTOMY          Family History   Problem Relation Age of Onset   • Diabetes Mother    • Hypertension Mother    • Diabetes Sister    • Diabetes Brother    • Alcohol abuse Father    • Cancer Neg Hx    • Heart Attack Neg Hx    • Heart Disease Neg Hx         Social History     Tobacco Use   Smoking Status Current Every Day Smoker   • Packs/day: 0.25   • Years: 30.00   • Pack years: 7.50   Smokeless Tobacco Never Used          Social History     Substance and Sexual Activity   Alcohol Use Yes   • Alcohol/week: 1.8 oz   • Types: 3 Cans of beer per week        ---------------------------------------------------------------------     PHYSICAL EXAM:   Vitals:    06/26/20 1638   BP: 112/82   Pulse: 70   Resp: 14   Temp: 35.8 °C (96.5 °F)   SpO2: 96%      Body mass index is 24.21 kg/m².        Constitutional: no acute distress  Eyes: PERRL, EOMI  Ears/nose/mouth: OP no exudates  Neck: supple, no JVD  CV: heart RRR  Resp: normal effort, expiratory wheezing noted mild  GI: abdomen soft, no obvious mass, no tenderness  Neuro: CN 2-12 grossly intact  Skin: no obvious rash noted  Knees : No signficant swelling redness or deformity.   ROM limited due to pain specifically w knee flexion/extension.   No signficant instability noted w varus/valgus maneuvers      ---------------------------------------------------------------------     ASSESSMENT and PLAN:  1. Need for vaccination    - Tdap Vaccine =>8YO IM  - Pneumovax Vaccine (PPSV23)    2. Postmenopausal status (age-related) (natural)    - DS-BONE DENSITY STUDY (DEXA)    4. Chronic pain of both knees  Chronic condition, uncontrolled.  History ordered today.  Refer the patient to physical therapy for treatment.  The patient may take over-the-counter Tylenol as needed for pain  - DX-KNEE 3 VIEWS LEFT; Future  - DX-KNEE 3 VIEWS RIGHT; Future  - REFERRAL TO PHYSICAL THERAPY Reason for Therapy: Eval/Treat/Report    5. Essential hypertension  Chronic stable condition.  Continue with  current management   Sodium restriction is advised.  - CBC WITH DIFFERENTIAL; Future    6. Mixed hyperlipidemia  Chronic condition.  Control is unclear.  Lab tests ordered.  Continue with atorvastatin at this time.  - ALANINE AMINO-TRANS; Future  - Lipid Profile; Future    7. Prediabetes  Chronic condition.  Patient is due for lab work.  - HEMOGLOBIN A1C; Future  - Basic Metabolic Panel; Future    8. Hypothyroidism, unspecified type  Chronic condition.  Lab tests ordered.  - TSH; Future    9. Low vitamin D level  Chronic condition.  Needs lab test for follow-up.  - VITAMIN 1,25 DIHYDROXY; Future    10. Graves' disease  History of.  Patient status post radioactive iodine treatment.  Currently taking levothyroxine.  - REFERRAL TO ENDOCRINOLOGY  - levothyroxine (SYNTHROID) 150 MCG Tab; Take 1 Tab by mouth every morning. ON A EMPTY STOMACH  Dispense: 90 Tab; Refill: 3      12. Chronic obstructive pulmonary disease, unspecified COPD type (HCC)  Chronic condition.  Strongly advised the patient quit smoking.  Start the patient on Spiriva.  She may use albuterol for rescue treatment.    13. Nicotine dependence with nicotine-induced disorder, unspecified nicotine product type  Chronic condition.  Strongly advised the patient to quit smoking.  Patient wishes to try Chantix again.  She has taken this medicine previously which helped her quit smoking for over 1 year.  Discussed with the patient and  I recommended for her to undergo low-dose CT scan to screen for lung cancer.  The patient declined at this time.

## 2020-06-27 NOTE — ASSESSMENT & PLAN NOTE
Patient has a history of Graves' disease.  She underwent radioactive iodine treatment in [place] approximately 15 years ago.  Patient is now taking levothyroxine.  She is requesting refill of the medication.  The patient also requests a referral to establish with endocrinologist.

## 2020-06-27 NOTE — ASSESSMENT & PLAN NOTE
Chronic condition.  The patient still smokes cigarettes approximately 1 pack/day.  Strongly advised the patient to quit smoking completely.

## 2020-06-27 NOTE — ASSESSMENT & PLAN NOTE
This is a chronic condition  Patient c.o  knee pain,   Timing: noted > 1year L worse than R  Context: denies recent trauma or injury  Severity: moderate  Quality of pain: sharp  Duration: up to several hrs  Associating symptoms:denies significant motor weakness or paresthesia. Denies F/C  Aggravating factors: knee joint movements, walking, in/out of vehicles       see attending note

## 2020-07-02 DIAGNOSIS — R79.89 LOW VITAMIN D LEVEL: ICD-10-CM

## 2020-07-02 RX ORDER — ERGOCALCIFEROL 1.25 MG/1
50000 CAPSULE ORAL
Qty: 12 CAP | Refills: 0 | Status: SHIPPED | OUTPATIENT
Start: 2020-07-02 | End: 2022-03-28

## 2020-07-15 ENCOUNTER — HOSPITAL ENCOUNTER (OUTPATIENT)
Dept: RADIOLOGY | Facility: MEDICAL CENTER | Age: 70
End: 2020-07-15
Attending: INTERNAL MEDICINE
Payer: COMMERCIAL

## 2020-07-15 ENCOUNTER — HOSPITAL ENCOUNTER (OUTPATIENT)
Dept: LAB | Facility: MEDICAL CENTER | Age: 70
End: 2020-07-15
Attending: INTERNAL MEDICINE
Payer: COMMERCIAL

## 2020-07-15 DIAGNOSIS — G89.29 CHRONIC PAIN OF BOTH KNEES: ICD-10-CM

## 2020-07-15 DIAGNOSIS — E03.9 HYPOTHYROIDISM, UNSPECIFIED TYPE: Chronic | ICD-10-CM

## 2020-07-15 DIAGNOSIS — I10 ESSENTIAL HYPERTENSION: Chronic | ICD-10-CM

## 2020-07-15 DIAGNOSIS — M25.561 CHRONIC PAIN OF BOTH KNEES: ICD-10-CM

## 2020-07-15 DIAGNOSIS — M25.562 CHRONIC PAIN OF BOTH KNEES: ICD-10-CM

## 2020-07-15 DIAGNOSIS — R73.03 PREDIABETES: Chronic | ICD-10-CM

## 2020-07-15 DIAGNOSIS — R79.89 LOW VITAMIN D LEVEL: Chronic | ICD-10-CM

## 2020-07-15 DIAGNOSIS — E78.2 MIXED HYPERLIPIDEMIA: Chronic | ICD-10-CM

## 2020-07-15 LAB
ALT SERPL-CCNC: 19 U/L (ref 2–50)
ANION GAP SERPL CALC-SCNC: 12 MMOL/L (ref 7–16)
BASOPHILS # BLD AUTO: 0.9 % (ref 0–1.8)
BASOPHILS # BLD: 0.04 K/UL (ref 0–0.12)
BUN SERPL-MCNC: 17 MG/DL (ref 8–22)
CALCIUM SERPL-MCNC: 8.9 MG/DL (ref 8.5–10.5)
CHLORIDE SERPL-SCNC: 109 MMOL/L (ref 96–112)
CHOLEST SERPL-MCNC: 186 MG/DL (ref 100–199)
CO2 SERPL-SCNC: 21 MMOL/L (ref 20–33)
CREAT SERPL-MCNC: 0.41 MG/DL (ref 0.5–1.4)
EOSINOPHIL # BLD AUTO: 0.13 K/UL (ref 0–0.51)
EOSINOPHIL NFR BLD: 2.8 % (ref 0–6.9)
ERYTHROCYTE [DISTWIDTH] IN BLOOD BY AUTOMATED COUNT: 46.3 FL (ref 35.9–50)
EST. AVERAGE GLUCOSE BLD GHB EST-MCNC: 120 MG/DL
FASTING STATUS PATIENT QL REPORTED: NORMAL
GLUCOSE SERPL-MCNC: 101 MG/DL (ref 65–99)
HBA1C MFR BLD: 5.8 % (ref 0–5.6)
HCT VFR BLD AUTO: 43.7 % (ref 37–47)
HDLC SERPL-MCNC: 68 MG/DL
HGB BLD-MCNC: 14.6 G/DL (ref 12–16)
IMM GRANULOCYTES # BLD AUTO: 0.01 K/UL (ref 0–0.11)
IMM GRANULOCYTES NFR BLD AUTO: 0.2 % (ref 0–0.9)
LDLC SERPL CALC-MCNC: 100 MG/DL
LYMPHOCYTES # BLD AUTO: 1.68 K/UL (ref 1–4.8)
LYMPHOCYTES NFR BLD: 35.9 % (ref 22–41)
MCH RBC QN AUTO: 32.2 PG (ref 27–33)
MCHC RBC AUTO-ENTMCNC: 33.4 G/DL (ref 33.6–35)
MCV RBC AUTO: 96.5 FL (ref 81.4–97.8)
MONOCYTES # BLD AUTO: 0.33 K/UL (ref 0–0.85)
MONOCYTES NFR BLD AUTO: 7.1 % (ref 0–13.4)
NEUTROPHILS # BLD AUTO: 2.49 K/UL (ref 2–7.15)
NEUTROPHILS NFR BLD: 53.1 % (ref 44–72)
NRBC # BLD AUTO: 0 K/UL
NRBC BLD-RTO: 0 /100 WBC
PLATELET # BLD AUTO: 177 K/UL (ref 164–446)
PMV BLD AUTO: 10 FL (ref 9–12.9)
POTASSIUM SERPL-SCNC: 4.2 MMOL/L (ref 3.6–5.5)
RBC # BLD AUTO: 4.53 M/UL (ref 4.2–5.4)
SODIUM SERPL-SCNC: 142 MMOL/L (ref 135–145)
TRIGL SERPL-MCNC: 90 MG/DL (ref 0–149)
TSH SERPL DL<=0.005 MIU/L-ACNC: 0.11 UIU/ML (ref 0.38–5.33)
WBC # BLD AUTO: 4.7 K/UL (ref 4.8–10.8)

## 2020-07-15 PROCEDURE — 80048 BASIC METABOLIC PNL TOTAL CA: CPT

## 2020-07-15 PROCEDURE — 84443 ASSAY THYROID STIM HORMONE: CPT

## 2020-07-15 PROCEDURE — 80061 LIPID PANEL: CPT

## 2020-07-15 PROCEDURE — 83036 HEMOGLOBIN GLYCOSYLATED A1C: CPT

## 2020-07-15 PROCEDURE — 73562 X-RAY EXAM OF KNEE 3: CPT | Mod: RT

## 2020-07-15 PROCEDURE — 73562 X-RAY EXAM OF KNEE 3: CPT | Mod: LT

## 2020-07-15 PROCEDURE — 82652 VIT D 1 25-DIHYDROXY: CPT

## 2020-07-15 PROCEDURE — 85025 COMPLETE CBC W/AUTO DIFF WBC: CPT

## 2020-07-15 PROCEDURE — 36415 COLL VENOUS BLD VENIPUNCTURE: CPT

## 2020-07-15 PROCEDURE — 84460 ALANINE AMINO (ALT) (SGPT): CPT

## 2020-07-16 ENCOUNTER — TELEPHONE (OUTPATIENT)
Dept: MEDICAL GROUP | Facility: PHYSICIAN GROUP | Age: 70
End: 2020-07-16

## 2020-07-16 DIAGNOSIS — E03.9 HYPOTHYROIDISM, UNSPECIFIED TYPE: Chronic | ICD-10-CM

## 2020-07-16 DIAGNOSIS — E05.00 GRAVES' DISEASE: ICD-10-CM

## 2020-07-16 RX ORDER — LEVOTHYROXINE SODIUM 137 UG/1
137 TABLET ORAL
Qty: 90 TAB | Refills: 3 | Status: SHIPPED | OUTPATIENT
Start: 2020-07-16 | End: 2021-08-03

## 2020-07-16 NOTE — TELEPHONE ENCOUNTER
----- Message from Crescencio Ballard M.D. sent at 7/15/2020  8:28 PM PDT -----    Please call patient :Xrays of both knees : no acute fracture or dislocation.  A referral has been submitted to PT  for further evaluation.

## 2020-07-16 NOTE — LETTER
July 17, 2020                    Kelli Morley Vides  [address] [address]            7/17/2020        Dear Kelli,      After several attempts, Crescencio Ballard M.D.'s office has been unable to reach you by phone regarding your recent referral request. and imaging results.     We ask that you contact us at [phone] at your earliest convenience. Our office hours are from 8:00a - 5:00p Monday thru Friday.    Kind regards,   Ernestine Benedict, Med Ass't

## 2020-07-18 LAB — 1,25(OH)2D3 SERPL-MCNC: 75.4 PG/ML (ref 19.9–79.3)

## 2020-07-20 DIAGNOSIS — M79.10 MUSCLE ACHE: ICD-10-CM

## 2020-07-21 RX ORDER — IBUPROFEN 600 MG/1
TABLET ORAL
Qty: 90 TAB | Refills: 1 | Status: SHIPPED | OUTPATIENT
Start: 2020-07-21 | End: 2020-12-10

## 2020-09-21 ENCOUNTER — PHYSICAL THERAPY (OUTPATIENT)
Dept: PHYSICAL THERAPY | Facility: REHABILITATION | Age: 70
End: 2020-09-21
Attending: INTERNAL MEDICINE
Payer: COMMERCIAL

## 2020-09-21 DIAGNOSIS — G89.29 CHRONIC PAIN OF BOTH KNEES: ICD-10-CM

## 2020-09-21 DIAGNOSIS — M25.561 CHRONIC PAIN OF BOTH KNEES: ICD-10-CM

## 2020-09-21 DIAGNOSIS — M25.562 CHRONIC PAIN OF BOTH KNEES: ICD-10-CM

## 2020-09-21 PROCEDURE — 97110 THERAPEUTIC EXERCISES: CPT

## 2020-09-21 PROCEDURE — 97161 PT EVAL LOW COMPLEX 20 MIN: CPT

## 2020-09-21 ASSESSMENT — ENCOUNTER SYMPTOMS
PAIN LOCATION: L>R KNEE
PAIN SCALE AT HIGHEST: 7
PAIN SCALE AT LOWEST: 0
QUALITY: SHARP
ALLEVIATING FACTORS: REST
PAIN TIMING: INTERMITTENT
PAIN SCALE: 7

## 2020-09-21 NOTE — OP THERAPY EVALUATION
Outpatient Physical Therapy  INITIAL EVALUATION    [facility] [place]  [address]  [place] [address]  Phone:  [phone]  Fax:  [phone]    Date of Evaluation: 2020    Patient: Kelli Vides  YOB: 1950  MRN: [id number]     Referring Provider: Crescencio Ballard M.D.  [address] [place] [address]   Referring Diagnosis Chronic pain of both knees [M25.561, M25.562, G89.29]     Time Calculation    Start time: 1500  Stop time: 1550 Time Calculation (min): 50 minutes         Chief Complaint: Knee Problem    Visit Diagnoses     ICD-10-CM   1. Chronic pain of both knees  M25.561    M25.562    G89.29         Subjective:   History of Present Illness:     Mechanism of injury:  Pt reports L>R knee pain present at her knee cap. She denies specific injury. She reports increased pain with standing and sit to stand, stiffness with bending and straightening, and feeling that L knee will give away. She reports no falls in last year. She states she avoids or holds on for stairs, curbs Denies B LE numbness/tingling, denies change in bowel/bladder. She does report that she occasionally will have L shin pain as well as L  Knee pain.    Pt works at [employer]- standing  7-8 hours, 5x/week. She reports she tried [employer] and that made her feet hurt so now she wears an athletic shoe that is more flat.  Sleep disturbance:  Non-restful sleep  Pain:     Current pain ratin    At best pain ratin    At worst pain ratin    Location:  L>R knee    Quality:  Sharp    Pain timing:  Intermittent    Relieving factors:  Rest    Exacerbated by: see above.  Social Support:     Lives in:  Multiple-level home (avoids stairs)    Lives with: room mate.  Diagnostic Tests:     Diagnostic Tests Comments:  R knee x-ray: Preserved knee joint spaces. No fracture or dislocation.    L knee x-ray: Preserved knee joint spaces. No fracture or dislocation.  Patient Goals:      Patient goals for therapy:  Decreased pain    Other patient goals:  Improve walking, not be sore at end of shift      Past Medical History:   Diagnosis Date   • Active smoker 4/18/2013   • Anxiety 4/7/2011   • Chronic back pain 4/7/2011   • Chronic pain 4/7/2011   • Grave's disease    • Graves' disease 4/7/2011   • Hyperlipidemia    • Hypertension      Past Surgical History:   Procedure Laterality Date   • TONSILLECTOMY AND ADENOIDECTOMY       Social History     Tobacco Use   • Smoking status: Current Every Day Smoker     Packs/day: 0.25     Years: 30.00     Pack years: 7.50   • Smokeless tobacco: Never Used   Substance Use Topics   • Alcohol use: Yes     Alcohol/week: 1.8 oz     Types: 3 Cans of beer per week     Family and Occupational History     Socioeconomic History   • Marital status:      Spouse name: Not on file   • Number of children: Not on file   • Years of education: Not on file   • Highest education level: Not on file   Occupational History   • Not on file       Objective     Observations     Additional Observation Details  Circumferential measurements at knee joitn line: R: 38cm. L: 38.5cm  Slight pes planus is standing with increased hip IR bilaterally    Neurological Testing     Sensation     Knee   Left Knee   Intact: light touch    Right Knee   Intact: light touch     Reflexes   Left   Patellar (L4): normal (2+)  Achilles (S1): normal (2+)  Clonus sign: negative    Right   Patellar (L4): normal (2+)  Achilles (S1): normal (2+)  Clonus sign: negative    Tenderness   Left Knee   Tenderness in the ITB, quadriceps tendon and superior patella. No tenderness in the inferior patella, lateral joint line, lateral patella, LCL (distal), LCL (proximal), MCL (distal), MCL (proximal), medial joint line, medial patella and patellar tendon.     Right Knee   Tenderness in the ITB, quadriceps tendon and superior patella. No tenderness in the inferior patella, lateral joint line, lateral patella, LCL  "(distal), LCL (proximal), MCL (distal), MCL (proximal), medial joint line, medial patella and patellar tendon.     Active Range of Motion   Left Knee   Flexion: 138 degrees   Extension: -4 degrees     Right Knee   Flexion: 135 degrees   Extension: -4 degrees     Additional Active Range of Motion Details  B ankle DF (ke): 3 degrees    Patellar Static Positioning   Left Knee: medial tilt  Right Knee: medial tilt    Patellar Mobility   Left Knee Patellar tendons within functional limits include the medial, lateral, superior and inferior.     Right Knee Patellar tendons within functional limits include the medial, lateral, superior and inferior.     Strength:      Left Knee   Flexion: 4+  Prone flexion: 4  Quadriceps contraction: fair    Right Knee   Flexion: 4+  Prone flexion: 4+  Quadriceps contraction: good    Additional Strength Details  R ankle: WFL  L ankle: inv/eversion: 4/5  R hip abd: 4/5  L hip abd: 4-/5    Tests     Left Knee   Negative anterior drawer, lateral Dianne, medial Dianne, patellar compression, posterior drawer, valgus stress test at 0 degrees, valgus stress test at 30 degrees, varus stress test at 0 degrees and varus stress test at 30 degrees.     Right Knee   Negative anterior drawer, lateral Dianne, medial Dianne, patellar compression, posterior drawer, valgus stress test at 0 degrees, valgus stress test at 30 degrees, varus stress test at 0 degrees and varus stress test at 30 degrees.   Ambulation     Comments   Pt amb with L>R toe out decreased step length bilaterally     Functional Assessment   Squat   Left valgus and right valgus.         Therapeutic Exercises (CPT 83605):     1. Arch lift, seated x20 B    2. Towel scrunch , x20    3. Foot intrinsics, x1 min B    4. Standing calf stretch , 20\" x3 B    5. QS, with towel roll 5\" x10    6. SLR , x10 B    7. Clam , x10  B    11. UPOC 11/30/20      Therapeutic Exercise Summary: Pt educated in HEP, provided with a handout      Time-based " treatments/modalities:    Physical Therapy Timed Treatment Charges  Therapeutic exercise minutes (CPT 80204): 15 minutes      Assessment, Response and Plan:   Impairments: abnormal gait, impaired physical strength, lacks appropriate home exercise program and pain with function    Assessment details:  Pt is a pleasant 68 yo female that presents to PT with signs and symptoms consistent with L>R PFP. Her L>R knee pain is likely affected by hip and ankle weakness as well as ppor biomechanics. She would benefit from skilled PT to address above listed functional impairments, increase function with less pain, and enhance QOL.  Prognosis: good    Goals:   Short Term Goals:   1. Pt will be independent and compliant with HEP  2. Pt will perform 10x SLR without reported fatigue  3. [employer]ewear will be assessed for pt's work  Short term goal time span:  2-4 weeks      Long Term Goals:    1. Increase B knee strength by 1/2 MMT strength  2.  Improve B ankle DF AROM to at least 7 degrees for improved flexibility and gait  3. Increase B hip abd strength by 1/2 MMT grade  4. Pt will tolerate stadning for work shift without increase in pain  5. Improve WOMAC by at least 15 points to demo improvement in overall function with less pain  Long term goal time span:  1-2 months    Plan:   Therapy options:  Physical therapy treatment to continue  Planned therapy interventions:  E Stim Unattended (CPT 03115), Gait Training (CPT 27234), Manual Therapy (CPT 76745), Neuromuscular Re-education (CPT 87119), Therapeutic Exercise (CPT 34148) and Ultrasound (CPT 65541)  Frequency:  2x week  Duration in weeks:  10  Duration in visits:  20  Discussed with:  Patient      Functional Assessment Used  PT Functional Assessment Tool Used: WOMAC  PT Functional Assessment Score: 52/80; 65%  WOMAC Grand Total: 54.17  (disregard)    Referring provider co-signature:  I have reviewed this plan of care and my co-signature certifies the need for  services.    Certification Period: 09/21/2020 to  11/30/20    Physician Signature: ________________________________ Date: ______________

## 2020-09-25 ENCOUNTER — PHYSICAL THERAPY (OUTPATIENT)
Dept: PHYSICAL THERAPY | Facility: REHABILITATION | Age: 70
End: 2020-09-25
Attending: INTERNAL MEDICINE
Payer: COMMERCIAL

## 2020-09-25 DIAGNOSIS — M25.562 CHRONIC PAIN OF BOTH KNEES: ICD-10-CM

## 2020-09-25 DIAGNOSIS — G89.29 CHRONIC PAIN OF BOTH KNEES: ICD-10-CM

## 2020-09-25 DIAGNOSIS — M25.561 CHRONIC PAIN OF BOTH KNEES: ICD-10-CM

## 2020-09-25 PROCEDURE — 97110 THERAPEUTIC EXERCISES: CPT

## 2020-09-25 NOTE — OP THERAPY DAILY TREATMENT
"  Outpatient Physical Therapy  DAILY TREATMENT     [facility] [place]  [address], [address]  [place] [address]  Phone:  [phone]  Fax:  [phone]    Date: 09/25/2020    Patient: Kelli Vides  YOB: 1950  MRN: [id number]     Time Calculation    Start time: 1525  Stop time: 1555 Time Calculation (min): 30 minutes         Chief Complaint: Knee Problem    Visit #: 2    SUBJECTIVE:  Sore in L hip after exercises, the laying on the side exercise made it sore. Used massager on side of L leg and it felt better after.    OBJECTIVE:          Therapeutic Exercises (CPT 12409):     1. Arch lift, HEP    2. Towel scrunch , HEP    3. Foot intrinsics, HEP    4. Standing calf stretch , 20\" x3 B    5. QS, with towel roll 10\" x10    6. SLR , x5 B    7. Hip abd, hookling OTB 3\" x10    8. Piriformis stretch, 2x30\" L     9. Hip IR, OTB x10 B , seated    10. HS stretch, with strap 3x20\"    11. NuStep , lvl 3 x 5 min    15. UPOC 11/30/20      Therapeutic Exercise Summary: Updated HEP- no clam for now.      Time-based treatments/modalities:    Physical Therapy Timed Treatment Charges  Therapeutic exercise minutes (CPT 12911): 28 minutes      Pain rating (1-10) before treatment:  5  Pain rating (1-10) after treatment:  5    ASSESSMENT:   Response to treatment: L>R LE tightness present, but no noted leg length discrepancy in standing or supine. Pt fatigued quickly with quad exercises and required VC's to prevent lag with L SLR.     PLAN/RECOMMENDATIONS:   Plan for treatment: therapy treatment to continue next visit. Progress strength as able. Pt does not know days off until Thurs of previous week, will have to schedule on a weekly basis.   Planned interventions for next visit: continue with current treatment.       "

## 2020-09-28 ENCOUNTER — APPOINTMENT (OUTPATIENT)
Dept: PHYSICAL THERAPY | Facility: REHABILITATION | Age: 70
End: 2020-09-28
Attending: INTERNAL MEDICINE
Payer: COMMERCIAL

## 2020-10-02 ENCOUNTER — APPOINTMENT (OUTPATIENT)
Dept: PHYSICAL THERAPY | Facility: REHABILITATION | Age: 70
End: 2020-10-02
Attending: INTERNAL MEDICINE
Payer: COMMERCIAL

## 2020-11-23 ENCOUNTER — TELEPHONE (OUTPATIENT)
Dept: PHYSICAL THERAPY | Facility: REHABILITATION | Age: 70
End: 2020-11-23

## 2020-11-23 NOTE — OP THERAPY DISCHARGE SUMMARY
Outpatient Physical Therapy  DISCHARGE SUMMARY NOTE      [facility] [place]  [address], [address]  [place] [address]  Phone:  [phone]  Fax:  [phone]    Date of Visit: 11/23/2020    Patient: Kelli Vides  YOB: 1950  MRN: [id number]     Referring Provider: Crescencio Ballard M.D.  [address] [place] [address] [place] [address]   Referring Diagnosis Chronic pain of both knees [M25.561, M25.562, G89.29]           Your patient is being discharged from Physical Therapy with the following comments:   · Patient has failed to schedule or reschedule follow-up visits    Comments:  Kelli German was seen for 2 PT sessions and failed to show for her remaining scheduled appointments.     Limitations Remaining:  Unknown as pt has not returned to PT and has not returned clinic calls.     Recommendations:  DC from PT at this time, pt to return to MD if needed. Thank you.     Sariah Abarca, PT, DPT    Date: 11/23/2020

## 2020-12-09 DIAGNOSIS — M79.10 MUSCLE ACHE: ICD-10-CM

## 2020-12-10 RX ORDER — IBUPROFEN 600 MG/1
TABLET ORAL
Qty: 90 TAB | Refills: 1 | Status: SHIPPED | OUTPATIENT
Start: 2020-12-10 | End: 2021-08-03

## 2020-12-29 ENCOUNTER — TELEPHONE (OUTPATIENT)
Dept: MEDICAL GROUP | Facility: PHYSICIAN GROUP | Age: 70
End: 2020-12-29

## 2020-12-29 NOTE — LETTER
Kelli Vides  [address] [address]            1/7/2021        Dear Kelli,      After several attempts, Crescencio Ballard M.D.'s office has been unable to reach you by phone regarding your recent paperwork request for FMLA.     We ask that you contact us at [phone] at your earliest convenience. Our office hours are from 8:00a - 5:00p Monday thru Friday.    Kind regards,   Manuel Green Ass't      If you have any questions or concerns, please don't hesitate to call.        Sincerely,        Crescencio Ballard M.D.    Electronically Signed

## 2020-12-29 NOTE — TELEPHONE ENCOUNTER
"Pt last appt on 6/26/2020    · FMLA paperwork received from Kelli  ·  requiring provider signature.     · All appropriate fields completed by Medical Assistant: Yes    · Paperwork placed in \"MA to Provider\" folder/basket. Awaiting provider completion/signature.    "

## 2021-03-03 DIAGNOSIS — Z23 NEED FOR VACCINATION: ICD-10-CM

## 2021-03-29 ENCOUNTER — OFFICE VISIT (OUTPATIENT)
Dept: MEDICAL GROUP | Facility: PHYSICIAN GROUP | Age: 71
End: 2021-03-29
Payer: COMMERCIAL

## 2021-03-29 VITALS
TEMPERATURE: 97.5 F | OXYGEN SATURATION: 97 % | RESPIRATION RATE: 14 BRPM | HEART RATE: 54 BPM | BODY MASS INDEX: 23.95 KG/M2 | SYSTOLIC BLOOD PRESSURE: 136 MMHG | DIASTOLIC BLOOD PRESSURE: 74 MMHG | HEIGHT: 67 IN | WEIGHT: 152.6 LBS

## 2021-03-29 DIAGNOSIS — Z11.59 NEED FOR HEPATITIS C SCREENING TEST: ICD-10-CM

## 2021-03-29 DIAGNOSIS — E03.9 HYPOTHYROIDISM, UNSPECIFIED TYPE: Chronic | ICD-10-CM

## 2021-03-29 DIAGNOSIS — H57.13 EYE PAIN, BILATERAL: ICD-10-CM

## 2021-03-29 DIAGNOSIS — M79.10 MUSCLE ACHE: ICD-10-CM

## 2021-03-29 DIAGNOSIS — J44.9 CHRONIC OBSTRUCTIVE PULMONARY DISEASE, UNSPECIFIED COPD TYPE (HCC): Chronic | ICD-10-CM

## 2021-03-29 DIAGNOSIS — F17.209 NICOTINE DEPENDENCE WITH NICOTINE-INDUCED DISORDER, UNSPECIFIED NICOTINE PRODUCT TYPE: Chronic | ICD-10-CM

## 2021-03-29 DIAGNOSIS — R79.89 LOW VITAMIN D LEVEL: Chronic | ICD-10-CM

## 2021-03-29 DIAGNOSIS — E05.00 GRAVES' DISEASE: ICD-10-CM

## 2021-03-29 DIAGNOSIS — I10 ESSENTIAL HYPERTENSION: Chronic | ICD-10-CM

## 2021-03-29 DIAGNOSIS — R73.03 PREDIABETES: Chronic | ICD-10-CM

## 2021-03-29 DIAGNOSIS — E78.2 MIXED HYPERLIPIDEMIA: Chronic | ICD-10-CM

## 2021-03-29 DIAGNOSIS — Z13.29 SCREENING FOR ENDOCRINE DISORDER: ICD-10-CM

## 2021-03-29 PROBLEM — R51.9 HEADACHE: Status: ACTIVE | Noted: 2021-03-29

## 2021-03-29 PROCEDURE — 99397 PER PM REEVAL EST PAT 65+ YR: CPT | Performed by: INTERNAL MEDICINE

## 2021-03-29 RX ORDER — GABAPENTIN 300 MG/1
300 CAPSULE ORAL 2 TIMES DAILY
Qty: 60 CAPSULE | Refills: 5 | Status: SHIPPED | OUTPATIENT
Start: 2021-03-29 | End: 2022-03-28 | Stop reason: SDUPTHER

## 2021-03-29 RX ORDER — VARENICLINE TARTRATE 1 MG/1
1 TABLET, FILM COATED ORAL 2 TIMES DAILY
Qty: 60 TABLET | Refills: 2 | Status: SHIPPED | OUTPATIENT
Start: 2021-03-29 | End: 2022-03-28

## 2021-03-29 RX ORDER — CYCLOBENZAPRINE HCL 10 MG
10 TABLET ORAL 2 TIMES DAILY PRN
Qty: 60 TABLET | Refills: 1 | Status: SHIPPED | OUTPATIENT
Start: 2021-03-29 | End: 2022-04-18 | Stop reason: SDUPTHER

## 2021-03-29 ASSESSMENT — ANXIETY QUESTIONNAIRES
2. NOT BEING ABLE TO STOP OR CONTROL WORRYING: NEARLY EVERY DAY
5. BEING SO RESTLESS THAT IT IS HARD TO SIT STILL: NEARLY EVERY DAY
7. FEELING AFRAID AS IF SOMETHING AWFUL MIGHT HAPPEN: MORE THAN HALF THE DAYS
4. TROUBLE RELAXING: MORE THAN HALF THE DAYS
GAD7 TOTAL SCORE: 18
6. BECOMING EASILY ANNOYED OR IRRITABLE: NEARLY EVERY DAY
3. WORRYING TOO MUCH ABOUT DIFFERENT THINGS: NEARLY EVERY DAY
1. FEELING NERVOUS, ANXIOUS, OR ON EDGE: MORE THAN HALF THE DAYS

## 2021-03-29 ASSESSMENT — PATIENT HEALTH QUESTIONNAIRE - PHQ9: CLINICAL INTERPRETATION OF PHQ2 SCORE: 0

## 2021-03-29 NOTE — ASSESSMENT & PLAN NOTE
Patient with history of Graves' disease.  Patient status post radioactive iodine treatment.  Patient currently taking levothyroxine.  Patient was referred to endocrinology however she has not been able to schedule an appointment.  Phone number for endocrinology given to the patient to call to schedule an appointment.

## 2021-03-29 NOTE — ASSESSMENT & PLAN NOTE
Chronic condition  Patient is presently using Spiriva 2 puff daily and albuterol as needed for rescue treatment.

## 2021-03-29 NOTE — ASSESSMENT & PLAN NOTE
Chronic condition.  Patient is on atenolol.  No significant side effects reported.  Advised the patient continue to monitor blood pressure regularly at home.

## 2021-03-30 NOTE — ASSESSMENT & PLAN NOTE
This has been a chronic condition noted for several years.  Patient denies any recent trauma or injury.  The pain is noted intermittently in both eyes randomly.  She denies significant change in vision.  Patient stated that she was seen by an optometrist at [facility] last week and no specific cause identified.  Patient stated that optometrist recommend brain CT scan to be done.  Patient is here request CT to come from PCP.    I also recommend for the patient to get a referral to see ophthalmologist for further evaluation.

## 2021-03-30 NOTE — PROGRESS NOTES
CC: Annual exam  Recurrent eye pain  Refill Flexeril and gabapentin      HPI: This is a 70 y.o. pt.  Pt's medical history is notable for:     COPD (chronic obstructive pulmonary disease) (HCC)  Chronic condition  Patient is presently using Spiriva 2 puff daily and albuterol as needed for rescue treatment.    Hyperlipidemia  Chronic condition.  Patient is presently taking atorvastatin.  Lipid panel ordered for follow-up    Hypertension  Chronic condition.  Patient is on atenolol.  No significant side effects reported.  Advised the patient continue to monitor blood pressure regularly at home.    Hypothyroid  Patient with history of Graves' disease.  Patient status post radioactive iodine treatment.  Patient currently taking levothyroxine.  Patient was referred to endocrinology however she has not been able to schedule an appointment.  Phone number for endocrinology given to the patient to call to schedule an appointment.    Low vitamin D level  Lab tests ordered for follow-up.    Nicotine dependence  Strongly advised the patient is important to quit smoking completely.    Prediabetes  Patient is currently on diet therapy.  Recommend regular exercise activities.    Eye pain, bilateral  This has been a chronic condition noted for several years.  Patient denies any recent trauma or injury.  The pain is noted intermittently in both eyes randomly.  She denies significant change in vision.  Patient stated that she was seen by an optometrist at [facility] last week and no specific cause identified.  Patient stated that optometrist recommend brain CT scan to be done.  Patient is here request CT to come from PCP.    I also recommend for the patient to get a referral to see ophthalmologist for further evaluation.    Muscle ache  Chronic condition.  The patient takes Flexeril as needed.  Currently she is asymptomatic.  The patient however requests a refill.          REVIEW OF SYSTEMS:     Constitutional:  no fever / chills    Neurologic: no headaches  Eyes: no changes in vision  ENT: no sore throat, no hearing loss  CV:  no chest pain, no palpitations  Pulmonary: no SOB, no cough          Allergies: Patient has no known allergies.    Current Outpatient Medications Ordered in Epic   Medication Sig Dispense Refill   • gabapentin (NEURONTIN) 300 MG Cap Take 1 capsule by mouth 2 times a day. 60 capsule 5   • varenicline (CHANTIX) 1 MG tablet Take 1 tablet by mouth 2 times a day. 60 tablet 2   • cyclobenzaprine (FLEXERIL) 10 mg Tab Take 1 tablet by mouth 2 times a day as needed. FOR MUSCLE SPASMS 60 tablet 1   • ibuprofen (MOTRIN) 600 MG Tab TAKE 1 TABLET BY MOUTH EVERY DAY AS NEEDED 90 Tab 1   • levothyroxine (SYNTHROID) 137 MCG Tab Take 1 Tab by mouth Every morning on an empty stomach. 90 Tab 3   • vitamin D, Ergocalciferol, (DRISDOL) 1.25 MG (90131 UT) Cap capsule Take 1 Cap by mouth every 7 days. 12 Cap 0   • varenicline (CHANTIX) 0.5 MG tablet Take 0.5 mg once daily on days 1-3 then 0.5mg bid on days 4-7 14 Tab 0   • Tiotropium Bromide Monohydrate (SPIRIVA RESPIMAT) 1.25 MCG/ACT Aero Soln Inhale 2 Puffs by mouth every day. 1 Inhaler 6   • albuterol 108 (90 Base) MCG/ACT Aero Soln inhalation aerosol Inhale 1-2 Puffs by mouth every 6 hours as needed for Shortness of Breath. 1 Inhaler 3   • atenolol (TENORMIN) 50 MG Tab TAKE 1 TABLET BY MOUTH EVERY DAY 90 Tab 0   • atorvastatin (LIPITOR) 40 MG Tab TAKE 1 TABLET BY MOUTH EVERY DAY 90 Tab 0   • fluticasone (FLONASE) 50 MCG/ACT nasal spray USE 1 SPRAY IN EACH NOSTRIL EVERY DAY 3 Bottle 0   • Tetrahydrozoline HCl (EYE DROPS OP) by Ophthalmic route.       No current [facility]-ordered facility-administered medications on file.       Past Medical, Social, and Family history reviewed and updated in EPIC     ------------------------------------------------------------------------------     PHYSICAL EXAM:   Vitals:    03/29/21 1612   BP: 136/74   Pulse: (!) 54   Resp: 14   Temp: 36.4 °C (97.5 °F)    SpO2: 97%      Body mass index is 23.9 kg/m².         Constitutional: no acute distress  Neck: supple, no JVD  CV: heart RRR  Resp: normal effort, no wheezing or rales.  GI: abdomen soft, no obvious mass, no tenderness  Neuro: CN 2-12 grossly intact        -----------------------------------------------------------------------------    ASSESSMENT:   1. Chronic obstructive pulmonary disease, unspecified COPD type (HCC)     2. Mixed hyperlipidemia  ALANINE AMINO-TRANS    Lipid Profile   3. Essential hypertension  Basic Metabolic Panel   4. Hypothyroidism, unspecified type  TSH   5. Low vitamin D level  VITAMIN D,25 HYDROXY   6. Nicotine dependence with nicotine-induced disorder, unspecified nicotine product type     7. Prediabetes     8. Graves' disease     9. Eye pain, bilateral  CT-HEAD W/O    REFERRAL TO OPHTHALMOLOGY   10. Screening for endocrine disorder  HEMOGLOBIN A1C    MICROALBUMIN CREAT RATIO URINE   11. Muscle ache  cyclobenzaprine (FLEXERIL) 10 mg Tab   12. Need for hepatitis C screening test  HEP C VIRUS ANTIBODY           MEDICAL DECISION MAKING: DISCUSSION / STATUS / PLAN:    Advised the patient to continue with current medications med list reviewed and updated.  Refill Flexeril and gabapentin per patient request.  She also requests prescription to try Chantix again to help her quit smoking.  Lab tests ordered.  Advised the patient to follow-up with endocrinologist for the history of Graves' disease.  Refer the patient to ophthalmology to evaluate recurrent bilateral eye pain.  Brain CT scan ordered per optometrist recommendation.  By the patient to follow-up after imaging is done.    Regarding health maintenance.  Discussed but the patient declined annual pulmonary function test.  She also declined Shingrix vaccine and influenza vaccination.     Return in about 6 months (around 9/29/2021).       PATIENT EDUCATION:  -If any problems should arise, patient was advised to contact our office or go to ER  to be evaluated.      Please note that this dictation was created using voice recognition software. I have made every reasonable attempt to correct obvious errors, but it is possible there are errors of grammar and possibly content that I did not discover before finalizing the note.

## 2021-03-30 NOTE — ASSESSMENT & PLAN NOTE
Chronic condition.  The patient takes Flexeril as needed.  Currently she is asymptomatic.  The patient however requests a refill.

## 2021-04-08 ENCOUNTER — HOSPITAL ENCOUNTER (OUTPATIENT)
Dept: RADIOLOGY | Facility: MEDICAL CENTER | Age: 71
End: 2021-04-08
Attending: INTERNAL MEDICINE
Payer: COMMERCIAL

## 2021-04-08 DIAGNOSIS — H57.13 EYE PAIN, BILATERAL: ICD-10-CM

## 2021-04-08 PROCEDURE — 70450 CT HEAD/BRAIN W/O DYE: CPT

## 2021-04-09 ENCOUNTER — TELEPHONE (OUTPATIENT)
Dept: MEDICAL GROUP | Facility: PHYSICIAN GROUP | Age: 71
End: 2021-04-09

## 2021-04-09 ENCOUNTER — TELEPHONE (OUTPATIENT)
Dept: ENDOCRINOLOGY | Facility: MEDICAL CENTER | Age: 71
End: 2021-04-09

## 2021-04-09 NOTE — TELEPHONE ENCOUNTER
----- Message from Crescencio Ballard M.D. sent at 4/8/2021  8:52 PM PDT -----    Please call patient :     Brain CT scan: good news no acute abnormality noted.

## 2021-04-09 NOTE — TELEPHONE ENCOUNTER
Received VM 3/19/21 stating she needs a NP appt, called pt to schedule and her mailbox is full, cannot LVM and she doesn't have a my chart so I cannot send a message.. js

## 2021-07-28 DIAGNOSIS — M79.10 MUSCLE ACHE: ICD-10-CM

## 2021-08-03 RX ORDER — LEVOTHYROXINE SODIUM 137 UG/1
TABLET ORAL
Qty: 90 TABLET | Refills: 0 | Status: SHIPPED | OUTPATIENT
Start: 2021-08-03 | End: 2021-11-03

## 2021-08-03 RX ORDER — IBUPROFEN 600 MG/1
TABLET ORAL
Qty: 90 TABLET | Refills: 0 | Status: SHIPPED | OUTPATIENT
Start: 2021-08-03 | End: 2021-11-06

## 2021-09-10 RX ORDER — LIDOCAINE 50 MG/G
PATCH TOPICAL
Qty: 90 PATCH | Refills: 0 | Status: SHIPPED | OUTPATIENT
Start: 2021-09-10

## 2021-11-03 DIAGNOSIS — E03.9 HYPOTHYROIDISM, UNSPECIFIED TYPE: ICD-10-CM

## 2021-11-03 RX ORDER — LEVOTHYROXINE SODIUM 137 UG/1
TABLET ORAL
Qty: 90 TABLET | Refills: 0 | Status: SHIPPED | OUTPATIENT
Start: 2021-11-03 | End: 2022-02-18

## 2021-11-04 DIAGNOSIS — I10 ESSENTIAL HYPERTENSION: ICD-10-CM

## 2021-11-04 RX ORDER — ATENOLOL 50 MG/1
50 TABLET ORAL
Qty: 90 TABLET | Refills: 1 | Status: SHIPPED | OUTPATIENT
Start: 2021-11-04 | End: 2022-03-28 | Stop reason: SDUPTHER

## 2021-11-05 DIAGNOSIS — M79.10 MUSCLE ACHE: ICD-10-CM

## 2021-11-06 RX ORDER — IBUPROFEN 600 MG/1
TABLET ORAL
Qty: 90 TABLET | Refills: 0 | Status: SHIPPED | OUTPATIENT
Start: 2021-11-06 | End: 2022-03-28 | Stop reason: SDUPTHER

## 2022-02-18 DIAGNOSIS — E03.9 ACQUIRED HYPOTHYROIDISM: ICD-10-CM

## 2022-03-28 ENCOUNTER — OFFICE VISIT (OUTPATIENT)
Dept: MEDICAL GROUP | Facility: PHYSICIAN GROUP | Age: 72
End: 2022-03-28
Payer: MEDICARE

## 2022-03-28 VITALS
HEIGHT: 67 IN | OXYGEN SATURATION: 97 % | SYSTOLIC BLOOD PRESSURE: 144 MMHG | BODY MASS INDEX: 23.23 KG/M2 | WEIGHT: 148 LBS | HEART RATE: 60 BPM | RESPIRATION RATE: 18 BRPM | DIASTOLIC BLOOD PRESSURE: 90 MMHG | TEMPERATURE: 97.4 F

## 2022-03-28 DIAGNOSIS — I10 ESSENTIAL HYPERTENSION: ICD-10-CM

## 2022-03-28 DIAGNOSIS — E03.9 HYPOTHYROIDISM, UNSPECIFIED TYPE: ICD-10-CM

## 2022-03-28 DIAGNOSIS — Z12.11 COLON CANCER SCREENING: ICD-10-CM

## 2022-03-28 DIAGNOSIS — M79.10 MUSCLE ACHE: ICD-10-CM

## 2022-03-28 DIAGNOSIS — Z78.0 POSTMENOPAUSAL ESTROGEN DEFICIENCY: ICD-10-CM

## 2022-03-28 DIAGNOSIS — Z11.59 NEED FOR HEPATITIS C SCREENING TEST: ICD-10-CM

## 2022-03-28 DIAGNOSIS — E78.2 MIXED HYPERLIPIDEMIA: ICD-10-CM

## 2022-03-28 DIAGNOSIS — R73.03 PREDIABETES: ICD-10-CM

## 2022-03-28 DIAGNOSIS — I10 PRIMARY HYPERTENSION: ICD-10-CM

## 2022-03-28 DIAGNOSIS — E05.00 GRAVES' DISEASE: ICD-10-CM

## 2022-03-28 DIAGNOSIS — F17.209 NICOTINE DEPENDENCE WITH NICOTINE-INDUCED DISORDER, UNSPECIFIED NICOTINE PRODUCT TYPE: Chronic | ICD-10-CM

## 2022-03-28 DIAGNOSIS — R79.89 LOW VITAMIN D LEVEL: ICD-10-CM

## 2022-03-28 DIAGNOSIS — Z12.31 ENCOUNTER FOR SCREENING MAMMOGRAM FOR MALIGNANT NEOPLASM OF BREAST: ICD-10-CM

## 2022-03-28 DIAGNOSIS — J44.9 CHRONIC OBSTRUCTIVE PULMONARY DISEASE, UNSPECIFIED COPD TYPE (HCC): ICD-10-CM

## 2022-03-28 PROCEDURE — 99214 OFFICE O/P EST MOD 30 MIN: CPT | Performed by: INTERNAL MEDICINE

## 2022-03-28 RX ORDER — ATENOLOL 50 MG/1
50 TABLET ORAL
Qty: 90 TABLET | Refills: 3 | Status: SHIPPED | OUTPATIENT
Start: 2022-03-28 | End: 2022-07-12 | Stop reason: SDUPTHER

## 2022-03-28 RX ORDER — LOSARTAN POTASSIUM 25 MG/1
25 TABLET ORAL DAILY
Qty: 90 TABLET | Refills: 3 | Status: SHIPPED | OUTPATIENT
Start: 2022-03-28 | End: 2022-07-12 | Stop reason: SDUPTHER

## 2022-03-28 RX ORDER — IBUPROFEN 600 MG/1
600 TABLET ORAL
Qty: 90 TABLET | Refills: 1 | Status: SHIPPED | OUTPATIENT
Start: 2022-03-28 | End: 2022-07-12 | Stop reason: SDUPTHER

## 2022-03-28 RX ORDER — GABAPENTIN 300 MG/1
300 CAPSULE ORAL 2 TIMES DAILY
Qty: 60 CAPSULE | Refills: 5 | Status: SHIPPED | OUTPATIENT
Start: 2022-03-28 | End: 2022-07-12 | Stop reason: SDUPTHER

## 2022-03-28 RX ORDER — LEVOTHYROXINE SODIUM 137 UG/1
137 TABLET ORAL
Qty: 90 TABLET | Refills: 3 | Status: SHIPPED | OUTPATIENT
Start: 2022-03-28 | End: 2022-07-12 | Stop reason: SDUPTHER

## 2022-03-28 ASSESSMENT — PATIENT HEALTH QUESTIONNAIRE - PHQ9: CLINICAL INTERPRETATION OF PHQ2 SCORE: 0

## 2022-03-28 NOTE — ASSESSMENT & PLAN NOTE
chronic condition.  The patient presently on Spiriva and she uses albuterol as needed.  Patient denies shortness of breath or wheezing.

## 2022-03-28 NOTE — ASSESSMENT & PLAN NOTE
Chronic condition.   Patient reported history of Graves' disease.    Patient is due for thyroid lab test.  Today I submitted a referral for the patient to be seen by endocrinology service.

## 2022-03-28 NOTE — ASSESSMENT & PLAN NOTE
Patient still smokes cigarettes up to 1 pack/day.  Strongly advised the patient is important to quit.

## 2022-03-28 NOTE — PROGRESS NOTES
PRIMARY CARE CLINIC VISIT  Chief Complaint   Patient presents with   • Follow-Up     Follow-up hypertension    History of Present Illness     COPD (chronic obstructive pulmonary disease) (HCC)  chronic condition.  The patient presently on Spiriva and she uses albuterol as needed.  Patient denies shortness of breath or wheezing.    Hyperlipidemia  Patient is currently taking atorvastatin.  Lab tests ordered for follow-up.    Hypertension  Currently the patient is taking atenolol 50 mg daily.  Her blood pressure was found to be mildly elevated today.    Hypothyroid  Chronic condition.   Patient reported history of Graves' disease.    Patient is due for thyroid lab test.  Today I submitted a referral for the patient to be seen by endocrinology service.    Nicotine dependence  Patient still smokes cigarettes up to 1 pack/day.  Strongly advised the patient is important to quit.    Prediabetes  Lab tests ordered for follow-up.      Current Outpatient Medications on File Prior to Visit   Medication Sig Dispense Refill   • Tiotropium Bromide Monohydrate (SPIRIVA RESPIMAT) 1.25 MCG/ACT Aero Soln Inhale 2 Puffs by mouth every day. 1 Inhaler 6   • albuterol 108 (90 Base) MCG/ACT Aero Soln inhalation aerosol Inhale 1-2 Puffs by mouth every 6 hours as needed for Shortness of Breath. 1 Inhaler 3   • lidocaine (LIDODERM) 5 % Patch APPLY 1 TO 3 PATCHES TO PAINFUL AREAS, 12 HOURS ON AND 12 HOURS OFF. MAY CUT INTO ANY SHAPE OR SIZE AND USE UP TO 3 AT ONCE 90 Patch 0   • cyclobenzaprine (FLEXERIL) 10 mg Tab Take 1 tablet by mouth 2 times a day as needed. FOR MUSCLE SPASMS 60 tablet 1   • atorvastatin (LIPITOR) 40 MG Tab TAKE 1 TABLET BY MOUTH EVERY DAY 90 Tab 0   • fluticasone (FLONASE) 50 MCG/ACT nasal spray USE 1 SPRAY IN EACH NOSTRIL EVERY DAY 3 Bottle 0   • Tetrahydrozoline HCl (EYE DROPS OP) by Ophthalmic route.       No current facility-administered medications on file prior to visit.        Allergies: Patient has no known  "allergies.    ROS  As per HPI above. All other systems reviewed and negative.      Past Medical, Social, and Family history reviewed and updated in EPIC     Objective     /90 (BP Location: Left arm, Patient Position: Sitting, BP Cuff Size: Adult)   Pulse 60   Temp 36.3 °C (97.4 °F) (Temporal)   Resp 18   Ht 1.702 m (5' 7\")   Wt 67.1 kg (148 lb)   SpO2 97%    Body mass index is 23.18 kg/m².    General: alert and oriented  Cardiovascular: regular rate and rhythm  Pulmonary: lungs : no wheezing   Gastrointestinal: BS present. No obvious mass noted          Assessment and Plan      Postmenopausal estrogen deficiency    - DS-BONE DENSITY STUDY (DEXA); Future    Colon cancer screening    - Referral to GI for Colonoscopy     Essential hypertension  Needing better control.  In addition to atenolol I recommend for the patient to start losartan 25 mg at bedtime.  Recommend follow-up in 6 weeks for blood pressure recheck.  Advised the patient regarding sodium restriction and encouraged regular walking exercise activities program  - atenolol (TENORMIN) 50 MG Tab; Take 1 Tablet by mouth every day.  Dispense: 90 Tablet; Refill: 3     Prediabetes    - HEMOGLOBIN A1C; Future  - Basic Metabolic Panel; Future         Chronic obstructive pulmonary disease, unspecified COPD type (HCC)  Chronic stable condition.  Continue with Spiriva daily and albuterol as needed.  Advised but the patient declined pulmonology referral.  He also declined PFTs testing.     hyperlipidemia  Lipid panel ordered for follow-up.  - ALANINE AMINO-TRANS; Future  - Lipid Profile; Future     Hypothyroidism, unspecified type  Chronic condition.  Lab tests ordered for follow-up.  - TSH; Future  Graves' disease    - Referral to Endocrinology     Low vitamin D level    - VITAMIN D,25 HYDROXY; Future     Encounter for screening mammogram for malignant neoplasm of breast    - MA-SCREENING MAMMO BILAT W/TOMOSYNTHESIS W/CAD; " Future                                 Please note that this dictation was created using voice recognition software. I have made every reasonable attempt to correct obvious errors but there may be errors of grammar and content that I may have overlooked prior to finalization of this note.      Crescencio Ballard MD  Internal Medicine  [facility]

## 2022-03-28 NOTE — ASSESSMENT & PLAN NOTE
Currently the patient is taking atenolol 50 mg daily.  Her blood pressure was found to be mildly elevated today.

## 2022-03-31 ENCOUNTER — HOSPITAL ENCOUNTER (OUTPATIENT)
Dept: LAB | Facility: MEDICAL CENTER | Age: 72
End: 2022-03-31
Attending: INTERNAL MEDICINE
Payer: MEDICARE

## 2022-03-31 DIAGNOSIS — E03.9 HYPOTHYROIDISM, UNSPECIFIED TYPE: ICD-10-CM

## 2022-03-31 DIAGNOSIS — E78.2 MIXED HYPERLIPIDEMIA: ICD-10-CM

## 2022-03-31 DIAGNOSIS — R79.89 LOW VITAMIN D LEVEL: ICD-10-CM

## 2022-03-31 DIAGNOSIS — R73.03 PREDIABETES: ICD-10-CM

## 2022-03-31 DIAGNOSIS — Z11.59 NEED FOR HEPATITIS C SCREENING TEST: ICD-10-CM

## 2022-03-31 DIAGNOSIS — I10 PRIMARY HYPERTENSION: ICD-10-CM

## 2022-03-31 LAB
25(OH)D3 SERPL-MCNC: 36 NG/ML (ref 30–100)
ALT SERPL-CCNC: 17 U/L (ref 2–50)
ANION GAP SERPL CALC-SCNC: 14 MMOL/L (ref 7–16)
BUN SERPL-MCNC: 12 MG/DL (ref 8–22)
CALCIUM SERPL-MCNC: 9.4 MG/DL (ref 8.5–10.5)
CHLORIDE SERPL-SCNC: 106 MMOL/L (ref 96–112)
CHOLEST SERPL-MCNC: 271 MG/DL (ref 100–199)
CO2 SERPL-SCNC: 22 MMOL/L (ref 20–33)
CREAT SERPL-MCNC: 0.5 MG/DL (ref 0.5–1.4)
ERYTHROCYTE [DISTWIDTH] IN BLOOD BY AUTOMATED COUNT: 52.4 FL (ref 35.9–50)
EST. AVERAGE GLUCOSE BLD GHB EST-MCNC: 108 MG/DL
FASTING STATUS PATIENT QL REPORTED: NORMAL
GFR SERPLBLD CREATININE-BSD FMLA CKD-EPI: 101 ML/MIN/1.73 M 2
GLUCOSE SERPL-MCNC: 88 MG/DL (ref 65–99)
HBA1C MFR BLD: 5.4 % (ref 4–5.6)
HCT VFR BLD AUTO: 47.4 % (ref 37–47)
HCV AB SER QL: REACTIVE
HDLC SERPL-MCNC: 85 MG/DL
HGB BLD-MCNC: 15.4 G/DL (ref 12–16)
LDLC SERPL CALC-MCNC: 160 MG/DL
MCH RBC QN AUTO: 32.4 PG (ref 27–33)
MCHC RBC AUTO-ENTMCNC: 32.5 G/DL (ref 33.6–35)
MCV RBC AUTO: 99.8 FL (ref 81.4–97.8)
PLATELET # BLD AUTO: 214 K/UL (ref 164–446)
PMV BLD AUTO: 10.4 FL (ref 9–12.9)
POTASSIUM SERPL-SCNC: 4.1 MMOL/L (ref 3.6–5.5)
RBC # BLD AUTO: 4.75 M/UL (ref 4.2–5.4)
SODIUM SERPL-SCNC: 142 MMOL/L (ref 135–145)
TRIGL SERPL-MCNC: 132 MG/DL (ref 0–149)
TSH SERPL DL<=0.005 MIU/L-ACNC: 0.99 UIU/ML (ref 0.38–5.33)
WBC # BLD AUTO: 8.8 K/UL (ref 4.8–10.8)

## 2022-03-31 PROCEDURE — 83036 HEMOGLOBIN GLYCOSYLATED A1C: CPT | Mod: GA

## 2022-03-31 PROCEDURE — 86803 HEPATITIS C AB TEST: CPT

## 2022-03-31 PROCEDURE — 84460 ALANINE AMINO (ALT) (SGPT): CPT

## 2022-03-31 PROCEDURE — 80048 BASIC METABOLIC PNL TOTAL CA: CPT

## 2022-03-31 PROCEDURE — 85027 COMPLETE CBC AUTOMATED: CPT

## 2022-03-31 PROCEDURE — 80061 LIPID PANEL: CPT

## 2022-03-31 PROCEDURE — 84443 ASSAY THYROID STIM HORMONE: CPT

## 2022-03-31 PROCEDURE — 36415 COLL VENOUS BLD VENIPUNCTURE: CPT

## 2022-03-31 PROCEDURE — 82306 VITAMIN D 25 HYDROXY: CPT | Mod: GA

## 2022-04-01 ENCOUNTER — TELEPHONE (OUTPATIENT)
Dept: MEDICAL GROUP | Facility: PHYSICIAN GROUP | Age: 72
End: 2022-04-01
Payer: MEDICARE

## 2022-04-01 DIAGNOSIS — B19.20 HEPATITIS C VIRUS INFECTION WITHOUT HEPATIC COMA, UNSPECIFIED CHRONICITY: ICD-10-CM

## 2022-04-01 RX ORDER — ROSUVASTATIN CALCIUM 40 MG/1
40 TABLET, COATED ORAL DAILY
Qty: 90 TABLET | Refills: 3 | Status: SHIPPED | OUTPATIENT
Start: 2022-04-01 | End: 2022-12-15 | Stop reason: SDUPTHER

## 2022-04-01 NOTE — TELEPHONE ENCOUNTER
Pt notified. Pt will get lab done tomorrow, Saturday morning at the [address]. Pt stated she will out of town next week and will call [facility] to schedule an appointment when she get back in town.

## 2022-04-01 NOTE — TELEPHONE ENCOUNTER
----- Message from Crescencio Ballard M.D. sent at 4/1/2022  9:38 AM PDT -----  pls notify pt labs back showed 2 abnormalities    -Blood test showed hepatitis C antibody>> needing further investigation.  Please return to any [facility] lab for additional blood testing including:  Hepatitis C viral load  Hepatitis B testing    We also ordered liver ultrasound to be done.  Please call [facility] [phone] to schedule the appointment.  Please follow-up with Dr. Ballard after ultrasound done.    In addition, A referral has been submitted to GASTROENTEROLOGY [Hepatitis specialist]  for a consultation.  After a few days, please call [facility] Referral Department at [phone] to check the status authorization of the referral.     --------------------------------------    Cholesterol :      271 very high                   [Desirable range = below 200]  LDL [bad cholesterol]:   160       [Desirable range = below 100]  Recommendation: Please discontinue taking atorvastatin.  Please start taking a different cholesterol medication rosuvastatin 40 mg 1 tablet at bedtime.  Please continue with low fat low cholesterol diet  , continue to exercise/walking regularly .  Please follow-up with Dr. Ballard in approximately 3 months to repeat the lipid panel.

## 2022-04-14 ENCOUNTER — HOSPITAL ENCOUNTER (OUTPATIENT)
Dept: LAB | Facility: MEDICAL CENTER | Age: 72
End: 2022-04-14
Attending: INTERNAL MEDICINE
Payer: MEDICARE

## 2022-04-14 ENCOUNTER — TELEPHONE (OUTPATIENT)
Dept: HEALTH INFORMATION MANAGEMENT | Facility: OTHER | Age: 72
End: 2022-04-14

## 2022-04-14 DIAGNOSIS — B19.20 HEPATITIS C VIRUS INFECTION WITHOUT HEPATIC COMA, UNSPECIFIED CHRONICITY: ICD-10-CM

## 2022-04-14 LAB
ALBUMIN SERPL BCP-MCNC: 4.3 G/DL (ref 3.2–4.9)
ALP SERPL-CCNC: 69 U/L (ref 30–99)
ALT SERPL-CCNC: 15 U/L (ref 2–50)
AST SERPL-CCNC: 21 U/L (ref 12–45)
BILIRUB CONJ SERPL-MCNC: <0.2 MG/DL (ref 0.1–0.5)
BILIRUB INDIRECT SERPL-MCNC: NORMAL MG/DL (ref 0–1)
BILIRUB SERPL-MCNC: 0.4 MG/DL (ref 0.1–1.5)
HBV SURFACE AB SERPL IA-ACNC: 935 MIU/ML (ref 0–10)
HBV SURFACE AG SER QL: NORMAL
PROT SERPL-MCNC: 6.7 G/DL (ref 6–8.2)

## 2022-04-14 PROCEDURE — 80076 HEPATIC FUNCTION PANEL: CPT

## 2022-04-14 PROCEDURE — 87522 HEPATITIS C REVRS TRNSCRPJ: CPT

## 2022-04-14 PROCEDURE — 86706 HEP B SURFACE ANTIBODY: CPT | Mod: GA

## 2022-04-14 PROCEDURE — 36415 COLL VENOUS BLD VENIPUNCTURE: CPT

## 2022-04-14 PROCEDURE — 87340 HEPATITIS B SURFACE AG IA: CPT | Mod: GA

## 2022-04-15 ENCOUNTER — TELEPHONE (OUTPATIENT)
Dept: MEDICAL GROUP | Facility: PHYSICIAN GROUP | Age: 72
End: 2022-04-15
Payer: MEDICARE

## 2022-04-15 NOTE — TELEPHONE ENCOUNTER
Pls call and notify pt    Recent blood test showed pt with POSITIVE HEPATITIS C antibody. It is very imporant to followup with liver specialist GASTROENTEROLOGIST. We have previously submitted the referral.    Blood tests showed the presence of antibodies to hepatitis  B.   These could be due to previous exposure to the viruses or in most cases due to previous vaccinations .   In either case no treatment is required    Thx.  Dr HINSON

## 2022-04-15 NOTE — TELEPHONE ENCOUNTER
Pt aware of Dr. Ballard's message regarding blood work. Pt states she has not been able to get a hold of GI but will call over this week to schedule a consult.

## 2022-04-17 LAB
HCV RNA SERPL NAA+PROBE-ACNC: NOT DETECTED IU/ML
HCV RNA SERPL NAA+PROBE-LOG IU: NOT DETECTED LOG IU/ML
HCV RNA SERPL QL NAA+PROBE: NOT DETECTED

## 2022-04-18 DIAGNOSIS — M79.10 MUSCLE ACHE: ICD-10-CM

## 2022-04-18 RX ORDER — CYCLOBENZAPRINE HCL 10 MG
10 TABLET ORAL 2 TIMES DAILY PRN
Qty: 60 TABLET | Refills: 1 | Status: SHIPPED | OUTPATIENT
Start: 2022-04-18 | End: 2022-11-17

## 2022-04-18 NOTE — TELEPHONE ENCOUNTER
Please call the patient.  Recent blood test reviewed    Good news, Hepatitis C viral load: Not detected      However due to previous lab work showed pt with  positive hepatitis C antibody we recommend for the patient to follow-up with gastroenterologist [liver specialist]    [facility]  [facility] [address] [place] [id number]  Phone: [phone]    If you have not scheduled the appointment please contact the specialist to arrange an appointment at your earliest convenience

## 2022-04-18 NOTE — TELEPHONE ENCOUNTER
Received request via: Patient    Was the patient seen in the last year in this department? Yes    Does the patient have an active prescription (recently filled or refills available) for medication(s) requested? No     PT aware of Hep C blood work results, she states she will await call to schedule appt. Pt wishes to get a refill on cyclobenzaprine sent to [facility] on [address]. Orders pended for you.

## 2022-04-21 ENCOUNTER — APPOINTMENT (OUTPATIENT)
Dept: RADIOLOGY | Facility: MEDICAL CENTER | Age: 72
End: 2022-04-21
Attending: INTERNAL MEDICINE

## 2022-05-09 ENCOUNTER — APPOINTMENT (OUTPATIENT)
Dept: MEDICAL GROUP | Facility: PHYSICIAN GROUP | Age: 72
End: 2022-05-09
Payer: MEDICARE

## 2022-05-10 ENCOUNTER — APPOINTMENT (OUTPATIENT)
Dept: RADIOLOGY | Facility: MEDICAL CENTER | Age: 72
End: 2022-05-10
Attending: INTERNAL MEDICINE

## 2022-05-27 ENCOUNTER — HOSPITAL ENCOUNTER (OUTPATIENT)
Dept: RADIOLOGY | Facility: MEDICAL CENTER | Age: 72
End: 2022-05-27
Attending: INTERNAL MEDICINE
Payer: MEDICARE

## 2022-05-27 DIAGNOSIS — Z78.0 POSTMENOPAUSAL ESTROGEN DEFICIENCY: ICD-10-CM

## 2022-05-27 DIAGNOSIS — Z12.31 ENCOUNTER FOR SCREENING MAMMOGRAM FOR MALIGNANT NEOPLASM OF BREAST: ICD-10-CM

## 2022-05-27 PROCEDURE — 77080 DXA BONE DENSITY AXIAL: CPT

## 2022-05-27 PROCEDURE — 77063 BREAST TOMOSYNTHESIS BI: CPT

## 2022-05-28 PROBLEM — M81.0 AGE-RELATED OSTEOPOROSIS WITHOUT CURRENT PATHOLOGICAL FRACTURE: Status: ACTIVE | Noted: 2022-05-28

## 2022-05-28 RX ORDER — ALENDRONATE SODIUM 70 MG/1
70 TABLET ORAL
Qty: 15 TABLET | Refills: 3 | Status: SHIPPED | OUTPATIENT
Start: 2022-05-28 | End: 2022-06-01

## 2022-06-01 ENCOUNTER — TELEPHONE (OUTPATIENT)
Dept: MEDICAL GROUP | Facility: PHYSICIAN GROUP | Age: 72
End: 2022-06-01

## 2022-06-01 RX ORDER — ALENDRONATE SODIUM 70 MG/1
70 TABLET ORAL
Qty: 15 TABLET | Refills: 3 | Status: SHIPPED | OUTPATIENT
Start: 2022-06-01

## 2022-06-01 NOTE — TELEPHONE ENCOUNTER
----- Message from Crescencio Ballard M.D. sent at 5/28/2022 11:39 AM PDT -----  Pls notify pt    Bone density test showed osteoporosis    Recommendation: Please start taking osteoporosis medication alendronate 70 mg 1 tablet ONCE A WEEK.    Rx has been sent to the pharmacy.    Please be sure to take vitamin D3 2000 units daily and calcium 600 mg twice a day. Please continue weight bearing exercises as tolerated.

## 2022-06-01 NOTE — TELEPHONE ENCOUNTER
Patient is aware of results. Patient states that the medication was sent to the wrong pharmacy. Please resent to the [facility] on [address]

## 2022-06-10 ENCOUNTER — HOSPITAL ENCOUNTER (OUTPATIENT)
Dept: RADIOLOGY | Facility: MEDICAL CENTER | Age: 72
End: 2022-06-10
Attending: INTERNAL MEDICINE
Payer: MEDICARE

## 2022-06-10 DIAGNOSIS — B19.20 HEPATITIS C VIRUS INFECTION WITHOUT HEPATIC COMA, UNSPECIFIED CHRONICITY: ICD-10-CM

## 2022-06-10 PROCEDURE — 76705 ECHO EXAM OF ABDOMEN: CPT

## 2022-06-11 PROBLEM — K76.89 LIVER CYST: Status: ACTIVE | Noted: 2022-06-11

## 2022-06-11 PROBLEM — K76.89 LIVER CYST: Chronic | Status: ACTIVE | Noted: 2022-06-11

## 2022-06-14 ENCOUNTER — TELEPHONE (OUTPATIENT)
Dept: MEDICAL GROUP | Facility: PHYSICIAN GROUP | Age: 72
End: 2022-06-14
Payer: MEDICARE

## 2022-06-14 NOTE — TELEPHONE ENCOUNTER
----- Message from Crescencio Ballard M.D. sent at 6/11/2022  9:26 AM PDT -----  Pls notify pt    Recent liver ultrasound showed fatty liver.    Ultrasound also showed cysts in the liver    Recommendation: It is very important to follow-up with gastroenterologist.  We have submitted a referral to gastroenterology consultant previously    Please contact the specialist's office to schedule your appointment if you do not already have one:         Gastroenterology consultant  [address] [place] [address]. [id number]  Phone: [phone]

## 2022-07-12 DIAGNOSIS — I10 ESSENTIAL HYPERTENSION: ICD-10-CM

## 2022-07-12 RX ORDER — ATENOLOL 50 MG/1
50 TABLET ORAL
Qty: 90 TABLET | Refills: 3 | Status: SHIPPED | OUTPATIENT
Start: 2022-07-12 | End: 2022-07-18 | Stop reason: SDUPTHER

## 2022-07-12 RX ORDER — IBUPROFEN 600 MG/1
600 TABLET ORAL
Qty: 90 TABLET | Refills: 1 | Status: SHIPPED | OUTPATIENT
Start: 2022-07-12 | End: 2022-07-18 | Stop reason: SDUPTHER

## 2022-07-12 RX ORDER — LEVOTHYROXINE SODIUM 137 UG/1
137 TABLET ORAL
Qty: 90 TABLET | Refills: 3 | Status: SHIPPED | OUTPATIENT
Start: 2022-07-12 | End: 2022-07-18 | Stop reason: SDUPTHER

## 2022-07-12 RX ORDER — LOSARTAN POTASSIUM 25 MG/1
25 TABLET ORAL DAILY
Qty: 90 TABLET | Refills: 3 | Status: SHIPPED | OUTPATIENT
Start: 2022-07-12 | End: 2022-07-18 | Stop reason: SDUPTHER

## 2022-07-12 RX ORDER — GABAPENTIN 300 MG/1
300 CAPSULE ORAL 2 TIMES DAILY
Qty: 60 CAPSULE | Refills: 5 | Status: SHIPPED | OUTPATIENT
Start: 2022-07-12 | End: 2022-07-18 | Stop reason: SDUPTHER

## 2022-07-18 DIAGNOSIS — I10 ESSENTIAL HYPERTENSION: ICD-10-CM

## 2022-07-18 RX ORDER — ATENOLOL 50 MG/1
50 TABLET ORAL
Qty: 90 TABLET | Refills: 3 | Status: SHIPPED | OUTPATIENT
Start: 2022-07-18 | End: 2023-07-10

## 2022-07-18 RX ORDER — LOSARTAN POTASSIUM 25 MG/1
25 TABLET ORAL DAILY
Qty: 90 TABLET | Refills: 3 | Status: SHIPPED | OUTPATIENT
Start: 2022-07-18 | End: 2023-06-06

## 2022-07-18 RX ORDER — LEVOTHYROXINE SODIUM 137 UG/1
137 TABLET ORAL
Qty: 90 TABLET | Refills: 3 | Status: SHIPPED | OUTPATIENT
Start: 2022-07-18

## 2022-07-18 RX ORDER — GABAPENTIN 300 MG/1
300 CAPSULE ORAL 2 TIMES DAILY
Qty: 60 CAPSULE | Refills: 5 | Status: SHIPPED | OUTPATIENT
Start: 2022-07-18

## 2022-07-18 RX ORDER — IBUPROFEN 600 MG/1
600 TABLET ORAL
Qty: 90 TABLET | Refills: 1 | Status: SHIPPED | OUTPATIENT
Start: 2022-07-18

## 2022-07-19 NOTE — TELEPHONE ENCOUNTER
Received request via: Patient    Was the patient seen in the last year in this department? Yes    Does the patient have an active prescription (recently filled or refills available) for medication(s) requested? Pt requested to have this sent to a different pharmacy

## 2022-11-02 ENCOUNTER — DOCUMENTATION (OUTPATIENT)
Dept: HEALTH INFORMATION MANAGEMENT | Facility: OTHER | Age: 72
End: 2022-11-02
Payer: MEDICARE

## 2022-12-15 RX ORDER — ROSUVASTATIN CALCIUM 40 MG/1
40 TABLET, COATED ORAL DAILY
Qty: 100 TABLET | Refills: 3 | Status: SHIPPED | OUTPATIENT
Start: 2022-12-15

## 2022-12-15 NOTE — TELEPHONE ENCOUNTER
Received request via: Patient    Was the patient seen in the last year in this department? Yes    Does the patient have an active prescription (recently filled or refills available) for medication(s) requested? No    Does the patient have care home Plus and need 100 day supply (blood pressure, diabetes and cholesterol meds only)? Yes, quantity updated to 100 days

## 2023-01-01 NOTE — TELEPHONE ENCOUNTER
Was the patient seen in the last year in this department? Yes    Does patient have an active prescription for medications requested? No     Received Request Via: Pharmacy      Pt met protocol?: Yes    LAST OV 04/27/2018      Lab Results  Component Value Date/Time   TSHULTRASEN 0.550 04/26/2018 1433             (1) minimal assist, teach one side; mother does other, staff holds